# Patient Record
Sex: MALE | Race: BLACK OR AFRICAN AMERICAN | NOT HISPANIC OR LATINO | URBAN - METROPOLITAN AREA
[De-identification: names, ages, dates, MRNs, and addresses within clinical notes are randomized per-mention and may not be internally consistent; named-entity substitution may affect disease eponyms.]

---

## 2019-07-16 ENCOUNTER — INPATIENT (INPATIENT)
Facility: HOSPITAL | Age: 64
LOS: 2 days | Discharge: HOME | DRG: 483 | End: 2019-07-19
Attending: ORTHOPAEDIC SURGERY | Admitting: ORTHOPAEDIC SURGERY
Payer: OTHER MISCELLANEOUS

## 2019-07-16 VITALS
HEIGHT: 68 IN | OXYGEN SATURATION: 94 % | SYSTOLIC BLOOD PRESSURE: 132 MMHG | RESPIRATION RATE: 18 BRPM | TEMPERATURE: 98 F | DIASTOLIC BLOOD PRESSURE: 89 MMHG | WEIGHT: 163.36 LBS | HEART RATE: 95 BPM

## 2019-07-16 DIAGNOSIS — S42.209A UNSPECIFIED FRACTURE OF UPPER END OF UNSPECIFIED HUMERUS, INITIAL ENCOUNTER FOR CLOSED FRACTURE: ICD-10-CM

## 2019-07-16 DIAGNOSIS — E11.9 TYPE 2 DIABETES MELLITUS WITHOUT COMPLICATIONS: ICD-10-CM

## 2019-07-16 DIAGNOSIS — Z98.890 OTHER SPECIFIED POSTPROCEDURAL STATES: Chronic | ICD-10-CM

## 2019-07-16 LAB
ALBUMIN SERPL ELPH-MCNC: 4.1 G/DL — SIGNIFICANT CHANGE UP (ref 3.3–5)
ALP SERPL-CCNC: 136 U/L — HIGH (ref 40–120)
ALT FLD-CCNC: 59 U/L — HIGH (ref 10–45)
ANION GAP SERPL CALC-SCNC: 11 MMOL/L — SIGNIFICANT CHANGE UP (ref 5–17)
APPEARANCE UR: CLEAR — SIGNIFICANT CHANGE UP
APTT BLD: 31.9 SEC — SIGNIFICANT CHANGE UP (ref 27.5–36.3)
AST SERPL-CCNC: 37 U/L — SIGNIFICANT CHANGE UP (ref 10–40)
BASOPHILS # BLD AUTO: 0.03 K/UL — SIGNIFICANT CHANGE UP (ref 0–0.2)
BASOPHILS NFR BLD AUTO: 0.3 % — SIGNIFICANT CHANGE UP (ref 0–2)
BILIRUB SERPL-MCNC: 0.6 MG/DL — SIGNIFICANT CHANGE UP (ref 0.2–1.2)
BILIRUB UR-MCNC: NEGATIVE — SIGNIFICANT CHANGE UP
BUN SERPL-MCNC: 16 MG/DL — SIGNIFICANT CHANGE UP (ref 7–23)
CALCIUM SERPL-MCNC: 10 MG/DL — SIGNIFICANT CHANGE UP (ref 8.4–10.5)
CHLORIDE SERPL-SCNC: 98 MMOL/L — SIGNIFICANT CHANGE UP (ref 96–108)
CO2 SERPL-SCNC: 27 MMOL/L — SIGNIFICANT CHANGE UP (ref 22–31)
COLOR SPEC: YELLOW — SIGNIFICANT CHANGE UP
CREAT SERPL-MCNC: 0.61 MG/DL — SIGNIFICANT CHANGE UP (ref 0.5–1.3)
DIFF PNL FLD: NEGATIVE — SIGNIFICANT CHANGE UP
EOSINOPHIL # BLD AUTO: 0.11 K/UL — SIGNIFICANT CHANGE UP (ref 0–0.5)
EOSINOPHIL NFR BLD AUTO: 1.1 % — SIGNIFICANT CHANGE UP (ref 0–6)
GLUCOSE SERPL-MCNC: 293 MG/DL — HIGH (ref 70–99)
GLUCOSE UR QL: >=1000
HCT VFR BLD CALC: 45.5 % — SIGNIFICANT CHANGE UP (ref 39–50)
HGB BLD-MCNC: 14.5 G/DL — SIGNIFICANT CHANGE UP (ref 13–17)
IMM GRANULOCYTES NFR BLD AUTO: 0.5 % — SIGNIFICANT CHANGE UP (ref 0–1.5)
INR BLD: 1.06 — SIGNIFICANT CHANGE UP (ref 0.88–1.16)
KETONES UR-MCNC: NEGATIVE — SIGNIFICANT CHANGE UP
LEUKOCYTE ESTERASE UR-ACNC: NEGATIVE — SIGNIFICANT CHANGE UP
LYMPHOCYTES # BLD AUTO: 2 K/UL — SIGNIFICANT CHANGE UP (ref 1–3.3)
LYMPHOCYTES # BLD AUTO: 20.3 % — SIGNIFICANT CHANGE UP (ref 13–44)
MCHC RBC-ENTMCNC: 27.2 PG — SIGNIFICANT CHANGE UP (ref 27–34)
MCHC RBC-ENTMCNC: 31.9 GM/DL — LOW (ref 32–36)
MCV RBC AUTO: 85.2 FL — SIGNIFICANT CHANGE UP (ref 80–100)
MONOCYTES # BLD AUTO: 1.07 K/UL — HIGH (ref 0–0.9)
MONOCYTES NFR BLD AUTO: 10.9 % — SIGNIFICANT CHANGE UP (ref 2–14)
NEUTROPHILS # BLD AUTO: 6.57 K/UL — SIGNIFICANT CHANGE UP (ref 1.8–7.4)
NEUTROPHILS NFR BLD AUTO: 66.9 % — SIGNIFICANT CHANGE UP (ref 43–77)
NITRITE UR-MCNC: NEGATIVE — SIGNIFICANT CHANGE UP
NRBC # BLD: 0 /100 WBCS — SIGNIFICANT CHANGE UP (ref 0–0)
PH UR: 5.5 — SIGNIFICANT CHANGE UP (ref 5–8)
PLATELET # BLD AUTO: 454 K/UL — HIGH (ref 150–400)
POTASSIUM SERPL-MCNC: 4.2 MMOL/L — SIGNIFICANT CHANGE UP (ref 3.5–5.3)
POTASSIUM SERPL-SCNC: 4.2 MMOL/L — SIGNIFICANT CHANGE UP (ref 3.5–5.3)
PROT SERPL-MCNC: 8.2 G/DL — SIGNIFICANT CHANGE UP (ref 6–8.3)
PROT UR-MCNC: ABNORMAL MG/DL
PROTHROM AB SERPL-ACNC: 12 SEC — SIGNIFICANT CHANGE UP (ref 10–12.9)
RBC # BLD: 5.34 M/UL — SIGNIFICANT CHANGE UP (ref 4.2–5.8)
RBC # FLD: 13 % — SIGNIFICANT CHANGE UP (ref 10.3–14.5)
SODIUM SERPL-SCNC: 136 MMOL/L — SIGNIFICANT CHANGE UP (ref 135–145)
SP GR SPEC: 1.02 — SIGNIFICANT CHANGE UP (ref 1–1.03)
UROBILINOGEN FLD QL: 1 E.U./DL — SIGNIFICANT CHANGE UP
WBC # BLD: 9.83 K/UL — SIGNIFICANT CHANGE UP (ref 3.8–10.5)
WBC # FLD AUTO: 9.83 K/UL — SIGNIFICANT CHANGE UP (ref 3.8–10.5)

## 2019-07-16 PROCEDURE — 78306 BONE IMAGING WHOLE BODY: CPT | Mod: 26

## 2019-07-16 PROCEDURE — 74176 CT ABD & PELVIS W/O CONTRAST: CPT | Mod: 26

## 2019-07-16 PROCEDURE — 99285 EMERGENCY DEPT VISIT HI MDM: CPT

## 2019-07-16 PROCEDURE — 71046 X-RAY EXAM CHEST 2 VIEWS: CPT | Mod: 26

## 2019-07-16 PROCEDURE — 71250 CT THORAX DX C-: CPT | Mod: 26

## 2019-07-16 PROCEDURE — 93010 ELECTROCARDIOGRAM REPORT: CPT | Mod: NC

## 2019-07-16 RX ORDER — GLUCAGON INJECTION, SOLUTION 0.5 MG/.1ML
1 INJECTION, SOLUTION SUBCUTANEOUS ONCE
Refills: 0 | Status: DISCONTINUED | OUTPATIENT
Start: 2019-07-16 | End: 2019-07-19

## 2019-07-16 RX ORDER — SODIUM CHLORIDE 9 MG/ML
1000 INJECTION, SOLUTION INTRAVENOUS
Refills: 0 | Status: DISCONTINUED | OUTPATIENT
Start: 2019-07-16 | End: 2019-07-19

## 2019-07-16 RX ORDER — DEXTROSE 50 % IN WATER 50 %
12.5 SYRINGE (ML) INTRAVENOUS ONCE
Refills: 0 | Status: DISCONTINUED | OUTPATIENT
Start: 2019-07-16 | End: 2019-07-19

## 2019-07-16 RX ORDER — GLYBURIDE 5 MG
0 TABLET ORAL
Qty: 0 | Refills: 0 | DISCHARGE

## 2019-07-16 RX ORDER — DEXTROSE 50 % IN WATER 50 %
25 SYRINGE (ML) INTRAVENOUS ONCE
Refills: 0 | Status: DISCONTINUED | OUTPATIENT
Start: 2019-07-16 | End: 2019-07-19

## 2019-07-16 RX ORDER — HYDROMORPHONE HYDROCHLORIDE 2 MG/ML
0.5 INJECTION INTRAMUSCULAR; INTRAVENOUS; SUBCUTANEOUS EVERY 4 HOURS
Refills: 0 | Status: DISCONTINUED | OUTPATIENT
Start: 2019-07-16 | End: 2019-07-19

## 2019-07-16 RX ORDER — INSULIN LISPRO 100/ML
VIAL (ML) SUBCUTANEOUS
Refills: 0 | Status: DISCONTINUED | OUTPATIENT
Start: 2019-07-16 | End: 2019-07-19

## 2019-07-16 RX ORDER — ONDANSETRON 8 MG/1
4 TABLET, FILM COATED ORAL EVERY 6 HOURS
Refills: 0 | Status: DISCONTINUED | OUTPATIENT
Start: 2019-07-16 | End: 2019-07-19

## 2019-07-16 RX ORDER — SENNA PLUS 8.6 MG/1
2 TABLET ORAL AT BEDTIME
Refills: 0 | Status: DISCONTINUED | OUTPATIENT
Start: 2019-07-16 | End: 2019-07-19

## 2019-07-16 RX ORDER — DOCUSATE SODIUM 100 MG
100 CAPSULE ORAL THREE TIMES A DAY
Refills: 0 | Status: DISCONTINUED | OUTPATIENT
Start: 2019-07-16 | End: 2019-07-19

## 2019-07-16 RX ORDER — OXYCODONE HYDROCHLORIDE 5 MG/1
10 TABLET ORAL EVERY 4 HOURS
Refills: 0 | Status: DISCONTINUED | OUTPATIENT
Start: 2019-07-16 | End: 2019-07-19

## 2019-07-16 RX ORDER — ACETAMINOPHEN 500 MG
650 TABLET ORAL EVERY 6 HOURS
Refills: 0 | Status: DISCONTINUED | OUTPATIENT
Start: 2019-07-16 | End: 2019-07-19

## 2019-07-16 RX ORDER — OXYCODONE HYDROCHLORIDE 5 MG/1
5 TABLET ORAL EVERY 4 HOURS
Refills: 0 | Status: DISCONTINUED | OUTPATIENT
Start: 2019-07-16 | End: 2019-07-19

## 2019-07-16 RX ORDER — DEXTROSE 50 % IN WATER 50 %
15 SYRINGE (ML) INTRAVENOUS ONCE
Refills: 0 | Status: DISCONTINUED | OUTPATIENT
Start: 2019-07-16 | End: 2019-07-19

## 2019-07-16 RX ADMIN — Medication 4: at 17:26

## 2019-07-16 RX ADMIN — OXYCODONE HYDROCHLORIDE 10 MILLIGRAM(S): 5 TABLET ORAL at 18:49

## 2019-07-16 RX ADMIN — OXYCODONE HYDROCHLORIDE 10 MILLIGRAM(S): 5 TABLET ORAL at 19:49

## 2019-07-16 RX ADMIN — OXYCODONE HYDROCHLORIDE 5 MILLIGRAM(S): 5 TABLET ORAL at 13:55

## 2019-07-16 NOTE — H&P ADULT - PROBLEM SELECTOR PLAN 2
Insulin sliding scale to be ordered while inpatient, monitor finger sticks, diabetic diet, continue appropriate medications

## 2019-07-16 NOTE — PATIENT PROFILE ADULT - NSPROEDALEARNPREF_GEN_A_NUR
individual instruction/verbal instruction/computer/internet/group instruction/skill demonstration/audio

## 2019-07-16 NOTE — ED PROVIDER NOTE - ATTENDING CONTRIBUTION TO CARE
65 yo M with PMH of DM presents to ED for admission for shoulder surgery. Pt pulled his shoulder while moving something at work 1 month ago with persistent pain and had an xray that was suspicious for a pathological fracture. Pt admits to pain. Denies numbness, tingling, swelling, fever, chills, cough, cp, sob. Pt AAO, NAD, right shoulder with limited ROM and TTP over proximal humerus. Labs/ studies noted. Pt is to be admitted for surgery and oncology workup. Preop labs, admit.

## 2019-07-16 NOTE — H&P ADULT - PROBLEM SELECTOR PLAN 1
Discussed with Dr. Orozco, will admit to orthopaedic service   Plan is for OR tomorrow for right proximal humerus resection and reconstruction   NPO/IVF for OR tonight   CT abdomen/pelvis/chest non-contrast  Bone scan  CXR  Labs - CBC, CMP, hepatic function tests, PT/INR/PTT, albumin, urine and blood protein electrophoresis, serum/urine light chains, total serum protein   Per Dr. Orozco, med/onc consulted and will see patient tomorrow  Dr. KAPADIA to contact Dr. Page for medical clearance   Labs/imaging pending review by medical clearance

## 2019-07-16 NOTE — ED ADULT NURSE NOTE - NSIMPLEMENTINTERV_GEN_ALL_ED
Implemented All Universal Safety Interventions:  [place] to call system. Call bell, personal items and telephone within reach. Instruct patient to call for assistance. Room bathroom lighting operational. Non-slip footwear when patient is off stretcher. Physically safe environment: no spills, clutter or unnecessary equipment. Stretcher in lowest position, wheels locked, appropriate side rails in place.

## 2019-07-16 NOTE — ED ADULT TRIAGE NOTE - CHIEF COMPLAINT QUOTE
Patient c/o rt shoulder pain , got injured at work 1 month ago , pain is worsening . Schedule to have surgery jesenia.

## 2019-07-16 NOTE — ED PROVIDER NOTE - CLINICAL SUMMARY MEDICAL DECISION MAKING FREE TEXT BOX
65 yo M with pmh of DM presents to ED for admission for shoulder surgery. Pt pulled his shoulder while moving something at work 1 month ago and then persisted with pain. Pt followed up with Dr. Orozco and had an xray that is suspicious for a pathological fracture. Pt is to be admitted for surgery and an oncology workup. Pt admits to pain. Denies numbness, tingling, swelling, fever, chills, cough, cp, sob. Preop labs, admit.

## 2019-07-16 NOTE — H&P ADULT - HISTORY OF PRESENT ILLNESS
The patient is a 64 year old male sent to the ED from Dr. Orozco's office for a right proximal humerus pathologic fracture. The patient states that he had an injury at work one month ago where he dropped a table and felt a "pull" in his right shoulder. The patient states he had pain in his right shoulder and then recently felt a "pop" in his right shoulder while he was driving which prompted a visit to orthopedist Dr. Mueller; patient had outpatient imaging and was ultimately sent to Dr. Orozco for definitive management. The patient denies pain in any other joints, fever/chills, numbness/tingling to the upper extremities.

## 2019-07-16 NOTE — H&P ADULT - NSHPPHYSICALEXAM_GEN_ALL_CORE
Gen: NAD, sitting comfortably on hospital chair  MSK: Right upper extremity in sling. Sensation intact and equal to bilateral upper extremities. Radial pulse palpable right upper extremity. PIN/AIN/ulnar nerve intact right upper extremity. ROM not tested due to known fracture.

## 2019-07-16 NOTE — ED PROVIDER NOTE - PHYSICAL EXAMINATION
CONSTITUTIONAL: Well-appearing; well-nourished; in no apparent distress.   HEAD: Normocephalic; atraumatic.   EYES: PERRL; EOM intact; conjunctiva and sclera clear  ENT: normal nose; no rhinorrhea; normal pharynx with no erythema or lesions.   NECK: Supple; non-tender; no LAD  CARDIOVASCULAR: Normal S1, S2; no murmurs, rubs, or gallops. Regular rate and rhythm.   RESPIRATORY: Breathing easily; breath sounds clear and equal bilaterally; no wheezes, rhonchi, or rales.  GI: Soft; non-distended; non-tender; no palpable organomegaly.   MSK: R shoulder in sling +tenderness, + swelling, unable to range shoulder   EXT: No cyanosis or edema; N/V intact  SKIN: Normal for age and race; warm; dry; good turgor; no apparent lesions or rash.   NEURO: A & O x 3; face symmetric; grossly unremarkable.   PSYCHOLOGICAL: The patient’s mood and manner are appropriate.

## 2019-07-16 NOTE — PROGRESS NOTE ADULT - SUBJECTIVE AND OBJECTIVE BOX
64 year old male sent to the ED from Dr. Orozco's office for a right proximal humerus pathologic fracture. The patient states that he had an injury at work one month ago where he dropped a table and felt a "pull" in his right shoulder. The patient states he had pain in his right shoulder and then recently felt a "pop" in his right shoulder while he was driving which prompted a visit to orthopedist Dr. Mueller; patient had outpatient imaging and was ultimately sent to Dr. Orozco for definitive management. The patient denies pain in any other joints, fever/chills, numbness/tingling to the upper extremities.       Patient History:   Past Medical, Past Surgical, and Family History:  PAST MEDICAL HISTORY:  Type 2 diabetes mellitus.     PAST SURGICAL HISTORY:  H/O foot surgery.      MEDICATIONS:        acetaminophen   Tablet .. 650 milliGRAM(s) Oral every 6 hours PRN  HYDROmorphone  Injectable 0.5 milliGRAM(s) IV Push every 4 hours PRN  ondansetron Injectable 4 milliGRAM(s) IV Push every 6 hours PRN  oxyCODONE    IR 10 milliGRAM(s) Oral every 4 hours PRN  oxyCODONE    IR 5 milliGRAM(s) Oral every 4 hours PRN    docusate sodium 100 milliGRAM(s) Oral three times a day  senna 2 Tablet(s) Oral at bedtime    dextrose 40% Gel 15 Gram(s) Oral once PRN  dextrose 50% Injectable 12.5 Gram(s) IV Push once  dextrose 50% Injectable 25 Gram(s) IV Push once  dextrose 50% Injectable 25 Gram(s) IV Push once  glucagon  Injectable 1 milliGRAM(s) IntraMuscular once PRN  insulin lispro (HumaLOG) corrective regimen sliding scale   SubCutaneous three times a day before meals    dextrose 5%. 1000 milliLiter(s) IV Continuous <Continuous>  lactated ringers. 1000 milliLiter(s) IV Continuous <Continuous>      Complaint: Pain    Otherwise 12 point review of systems is normal.    PHYSICAL EXAM:    Constitutional: NAD  Eyes: PERRL, EOMI, sclera non-icteric  Neck: supple, no masses, no JVD  Respiratory: CTA b/l, good air entry b/l, no wheezing, rhonchi, rales, with normal respiratory effort and no intercostal retractions  Cardiovascular: RRR, normal S1S2, no M/R/G  Gastrointestinal: soft, NTND, no masses palpable, BS normal in all four quadrants,   Extremities:  no c/c/e  Neurological: AAOx3      ICU Vital Signs Last 24 Hrs  T(C): 36.5 (16 Jul 2019 17:43), Max: 37.2 (16 Jul 2019 13:44)  T(F): 97.7 (16 Jul 2019 17:43), Max: 99 (16 Jul 2019 13:44)  HR: 91 (16 Jul 2019 17:43) (69 - 95)  BP: 133/81 (16 Jul 2019 17:43) (115/78 - 133/81)  BP(mean): --  ABP: --  ABP(mean): --  RR: 17 (16 Jul 2019 17:43) (16 - 18)  SpO2: 98% (16 Jul 2019 17:43) (94% - 98%)    LABS:	 	  CARDIAC MARKERS:    < from: NM Bone Imaging Total (07.16.19 @ 16:33) >  Impression:     Abnormal radiotracer uptake within the proximal humerus at the site of   the fracture. The uptake appears to extend to the scapula correlating to   the lesion seen on CT chest. No additional sites of abnormal radiotracer   uptake.    < end of copied text >    < from: CT Abdomen and Pelvis No Cont (07.16.19 @ 12:50) >    IMPRESSION: Limited study without contrast.    Osteolytic lesion in the right humeral head and neck as wellas right   scapula. Differential diagnosis includes neoplasms such as lymphoma and   plasmacytoma, etc as well as infection.      < end of copied text >  < from: CT Chest No Cont (07.16.19 @ 12:49) >    IMPRESSION: Limited study without contrast.    Osteolytic lesion in the right humeral head and neck as wellas right   scapula. Differential diagnosis includes neoplasms such as lymphoma and   plasmacytoma, etc as well as infection.    A couple of subcentimeter pulmonary nodules, which do not have typical   appearance of metastasis.    Hypodense hepatic lesion, probably a cyst. Recommend correlation with   ultrasound.      < end of copied text >  < from: Xray Chest 2 Views PA/Lat (07.16.19 @ 12:50) >    PA and lateral.    Findings/  impression: Proximal right humeral shaft fracture. Heart, lungs and   mediastinum, unremarkable..  Thoracic spine degenerative changes. Article   of clothing metallic structure overlying the right anterior hemithorax.   Colonic contrast material.          < end of copied text >                                 14.5   9.83  )-----------( 454      ( 16 Jul 2019 12:28 )             45.5     07-16    136  |  98  |  16  ----------------------------<  293<H>  4.2   |  27  |  0.61    Ca    10.0      16 Jul 2019 12:28    TPro  8.2  /  Alb  4.1  /  TBili  0.6  /  DBili  0.2  /  AST  37  /  ALT  59<H>  /  AlkPhos  136<H>  07-16      ASSESSMENT/PLAN: 	  64M with right proximal humerus pathologic fracture     Problem/Plan - 1:  ·  Problem: Proximal humeral fracture.  Plan: Discussed with Dr. Orozco, will admit to orthopaedic service   Plan is for OR tomorrow for right proximal humerus resection and reconstruction   NPO/IVF for OR tonight   CT abdomen/pelvis/chest non-contrast  Bone scan  CXR    Problem/Plan - 2:  ·  Problem: Type 2 diabetes mellitus.  Plan: Insulin sliding scale to be ordered while inpatient, monitor finger sticks, diabetic diet, continue appropriate medications.

## 2019-07-16 NOTE — ED ADULT TRIAGE NOTE - MODE OF ARRIVAL
How Severe Is Your Skin Lesion?: moderate
Have Your Skin Lesions Been Treated?: not been treated
Is This A New Presentation, Or A Follow-Up?: Skin Lesions
Public Transport

## 2019-07-16 NOTE — ED PROVIDER NOTE - OBJECTIVE STATEMENT
63 yo M with pmh of DM presents to ED for admission for shoulder surgery. Pt pulled his shoulder while moving something at work 1 month ago and then persisted with pain. Pt followed up with Dr. Orozco and had an xray that is suspicious for a pathological fracture. Pt is to be admitted for surgery and an oncology workup. Pt admits to pain. Denies numbness, tingling, swelling, fever, chills, cough, cp, sob.

## 2019-07-16 NOTE — ED ADULT NURSE NOTE - OBJECTIVE STATEMENT
states he injured his right arm after he dropped a table, then re injured it two weeks ago. states he was driving, coughed and felt a pop to right shoulder. denies any numbness/tingling to right upper. states he has a rotator cuff injury. right arm noted to be in a sling. states he took 5 mg oxycodone 2 hours ago.

## 2019-07-17 ENCOUNTER — RESULT REVIEW (OUTPATIENT)
Age: 64
End: 2019-07-17

## 2019-07-17 LAB
% ALBUMIN: 50.1 % — SIGNIFICANT CHANGE UP
% ALPHA 1: 5.6 % — SIGNIFICANT CHANGE UP
% ALPHA 2: 14.3 % — SIGNIFICANT CHANGE UP
% BETA: 14.4 % — SIGNIFICANT CHANGE UP
% GAMMA: 15.6 % — SIGNIFICANT CHANGE UP
ALBUMIN SERPL ELPH-MCNC: 4.1 G/DL — SIGNIFICANT CHANGE UP (ref 3.6–5.5)
ALBUMIN/GLOB SERPL ELPH: 1 RATIO — SIGNIFICANT CHANGE UP
ALPHA1 GLOB SERPL ELPH-MCNC: 0.5 G/DL — HIGH (ref 0.1–0.4)
ALPHA2 GLOB SERPL ELPH-MCNC: 1.2 G/DL — HIGH (ref 0.5–1)
ANION GAP SERPL CALC-SCNC: 11 MMOL/L — SIGNIFICANT CHANGE UP (ref 5–17)
APTT BLD: 31 SEC — SIGNIFICANT CHANGE UP (ref 27.5–36.3)
B-GLOBULIN SERPL ELPH-MCNC: 1.2 G/DL — HIGH (ref 0.5–1)
BUN SERPL-MCNC: 15 MG/DL — SIGNIFICANT CHANGE UP (ref 7–23)
CALCIUM SERPL-MCNC: 9.7 MG/DL — SIGNIFICANT CHANGE UP (ref 8.4–10.5)
CHLORIDE SERPL-SCNC: 98 MMOL/L — SIGNIFICANT CHANGE UP (ref 96–108)
CO2 SERPL-SCNC: 28 MMOL/L — SIGNIFICANT CHANGE UP (ref 22–31)
CREAT SERPL-MCNC: 0.65 MG/DL — SIGNIFICANT CHANGE UP (ref 0.5–1.3)
GAMMA GLOBULIN: 1.3 G/DL — SIGNIFICANT CHANGE UP (ref 0.6–1.6)
GLUCOSE SERPL-MCNC: 252 MG/DL — HIGH (ref 70–99)
HBA1C BLD-MCNC: 10.5 % — HIGH (ref 4–5.6)
HCT VFR BLD CALC: 42.8 % — SIGNIFICANT CHANGE UP (ref 39–50)
HGB BLD-MCNC: 13.2 G/DL — SIGNIFICANT CHANGE UP (ref 13–17)
INR BLD: 1.07 — SIGNIFICANT CHANGE UP (ref 0.88–1.16)
KAPPA LC SER QL IFE: 6.42 MG/DL — HIGH (ref 0.33–1.94)
KAPPA/LAMBDA FREE LIGHT CHAIN RATIO, SERUM: 2.15 RATIO — HIGH (ref 0.26–1.65)
LAMBDA LC SER QL IFE: 2.99 MG/DL — HIGH (ref 0.57–2.63)
MCHC RBC-ENTMCNC: 26.7 PG — LOW (ref 27–34)
MCHC RBC-ENTMCNC: 30.8 GM/DL — LOW (ref 32–36)
MCV RBC AUTO: 86.6 FL — SIGNIFICANT CHANGE UP (ref 80–100)
NRBC # BLD: 0 /100 WBCS — SIGNIFICANT CHANGE UP (ref 0–0)
PLATELET # BLD AUTO: 402 K/UL — HIGH (ref 150–400)
POTASSIUM SERPL-MCNC: 3.9 MMOL/L — SIGNIFICANT CHANGE UP (ref 3.5–5.3)
POTASSIUM SERPL-SCNC: 3.9 MMOL/L — SIGNIFICANT CHANGE UP (ref 3.5–5.3)
PROT PATTERN SERPL ELPH-IMP: SIGNIFICANT CHANGE UP
PROT SERPL-MCNC: 8.2 G/DL — SIGNIFICANT CHANGE UP (ref 6–8.3)
PROTHROM AB SERPL-ACNC: 12.1 SEC — SIGNIFICANT CHANGE UP (ref 10–12.9)
RBC # BLD: 4.94 M/UL — SIGNIFICANT CHANGE UP (ref 4.2–5.8)
RBC # FLD: 13.2 % — SIGNIFICANT CHANGE UP (ref 10.3–14.5)
SODIUM SERPL-SCNC: 137 MMOL/L — SIGNIFICANT CHANGE UP (ref 135–145)
WBC # BLD: 9.31 K/UL — SIGNIFICANT CHANGE UP (ref 3.8–10.5)
WBC # FLD AUTO: 9.31 K/UL — SIGNIFICANT CHANGE UP (ref 3.8–10.5)

## 2019-07-17 PROCEDURE — 99222 1ST HOSP IP/OBS MODERATE 55: CPT

## 2019-07-17 PROCEDURE — 73030 X-RAY EXAM OF SHOULDER: CPT | Mod: 26,RT

## 2019-07-17 RX ORDER — HYDROMORPHONE HYDROCHLORIDE 2 MG/ML
0.5 INJECTION INTRAMUSCULAR; INTRAVENOUS; SUBCUTANEOUS
Refills: 0 | Status: DISCONTINUED | OUTPATIENT
Start: 2019-07-17 | End: 2019-07-19

## 2019-07-17 RX ORDER — BUPIVACAINE 13.3 MG/ML
20 INJECTION, SUSPENSION, LIPOSOMAL INFILTRATION ONCE
Refills: 0 | Status: DISCONTINUED | OUTPATIENT
Start: 2019-07-17 | End: 2019-07-19

## 2019-07-17 RX ORDER — CEFAZOLIN SODIUM 1 G
2000 VIAL (EA) INJECTION EVERY 8 HOURS
Refills: 0 | Status: COMPLETED | OUTPATIENT
Start: 2019-07-17 | End: 2019-07-18

## 2019-07-17 RX ORDER — CEFAZOLIN SODIUM 1 G
2000 VIAL (EA) INJECTION EVERY 8 HOURS
Refills: 0 | Status: DISCONTINUED | OUTPATIENT
Start: 2019-07-17 | End: 2019-07-17

## 2019-07-17 RX ADMIN — OXYCODONE HYDROCHLORIDE 10 MILLIGRAM(S): 5 TABLET ORAL at 11:20

## 2019-07-17 RX ADMIN — OXYCODONE HYDROCHLORIDE 10 MILLIGRAM(S): 5 TABLET ORAL at 00:58

## 2019-07-17 RX ADMIN — Medication 2000 MILLIGRAM(S): at 20:22

## 2019-07-17 RX ADMIN — Medication 4: at 07:37

## 2019-07-17 RX ADMIN — OXYCODONE HYDROCHLORIDE 10 MILLIGRAM(S): 5 TABLET ORAL at 19:32

## 2019-07-17 RX ADMIN — OXYCODONE HYDROCHLORIDE 10 MILLIGRAM(S): 5 TABLET ORAL at 10:19

## 2019-07-17 RX ADMIN — SENNA PLUS 2 TABLET(S): 8.6 TABLET ORAL at 23:17

## 2019-07-17 RX ADMIN — Medication 100 MILLIGRAM(S): at 23:16

## 2019-07-17 RX ADMIN — OXYCODONE HYDROCHLORIDE 10 MILLIGRAM(S): 5 TABLET ORAL at 23:16

## 2019-07-17 RX ADMIN — OXYCODONE HYDROCHLORIDE 10 MILLIGRAM(S): 5 TABLET ORAL at 00:28

## 2019-07-17 RX ADMIN — OXYCODONE HYDROCHLORIDE 10 MILLIGRAM(S): 5 TABLET ORAL at 19:01

## 2019-07-17 RX ADMIN — Medication 4: at 16:50

## 2019-07-17 NOTE — PROGRESS NOTE ADULT - SUBJECTIVE AND OBJECTIVE BOX
Orthopedics Post Op Check    Procedure: right proximal humerus orif   Surgeon:    Pt.    Denies any SOB/CP/nausea/vomiting.     Vital Signs Last 24 Hrs  T(C): 36.5 (17 Jul 2019 09:11), Max: 37 (17 Jul 2019 04:48)  T(F): 97.7 (17 Jul 2019 09:11), Max: 98.6 (17 Jul 2019 04:48)  HR: 69 (17 Jul 2019 09:11) (67 - 91)  BP: 154/90 (17 Jul 2019 09:11) (128/76 - 154/90)  BP(mean): --  RR: 17 (17 Jul 2019 09:11) (16 - 18)  SpO2: 99% (17 Jul 2019 09:11) (95% - 99%)      Dressing C/D/I    Pulses: Brachial/Radial  SLT:  Motor:   Finger Int  Wrist flexion/ext  Biceps/triceps/Delts                          13.2   9.31  )-----------( 402      ( 17 Jul 2019 06:32 )             42.8   07-17    137  |  98  |  15  ----------------------------<  252<H>  3.9   |  28  |  0.65    Ca    9.7      17 Jul 2019 06:32    TPro  8.2  /  Alb  4.1  /  TBili  0.6  /  DBili  0.2  /  AST  37  /  ALT  59<H>  /  AlkPhos  136<H>  07-16    Post op XR:    A/P: 64yoMale POD#0 s/p   - Stable  - Pain Control  - DVT ppx:  - Post op abx:  - PT, WBS:   - F/U AM Labs Orthopedics Post Op Check    Procedure: right shoulder hemiarthroplasty  Surgeon: Pam    Pt. stable, laying in bed comfortably.  Denies any SOB/CP/nausea/vomiting, numbness or tingling in right ue.     Vital Signs Last 24 Hrs  T(C): 36.5 (17 Jul 2019 09:11), Max: 37 (17 Jul 2019 04:48)  T(F): 97.7 (17 Jul 2019 09:11), Max: 98.6 (17 Jul 2019 04:48)  HR: 69 (17 Jul 2019 09:11) (67 - 91)  BP: 154/90 (17 Jul 2019 09:11) (128/76 - 154/90)  BP(mean): --  RR: 17 (17 Jul 2019 09:11) (16 - 18)  SpO2: 99% (17 Jul 2019 09:11) (95% - 99%)      Dressing C/D/I with 1 drain     Pulses: Brachial/Radial pulses 2+ b/l ues   SLT:  intact M/U/R  Motor:  5/5,Finger Int 5/5,Wrist flexion/ext 5/5, Biceps/triceps/Delts NOT TESTES 2/2 TO SX, AIN/PIN intact b/l ues                          13.2   9.31  )-----------( 402      ( 17 Jul 2019 06:32 )             42.8   07-17    137  |  98  |  15  ----------------------------<  252<H>  3.9   |  28  |  0.65    Ca    9.7      17 Jul 2019 06:32    TPro  8.2  /  Alb  4.1  /  TBili  0.6  /  DBili  0.2  /  AST  37  /  ALT  59<H>  /  AlkPhos  136<H>  07-16    Post op XR:    A/P: 64yoMale POD#0 s/p right shoulder hemiathroplasty   - Stable  - Pain Control  - DVT ppx:scds  - Post op abx: ancef   - PT, WBS: nwb right ue with sling   - F/U AM Labs

## 2019-07-17 NOTE — PROGRESS NOTE ADULT - SUBJECTIVE AND OBJECTIVE BOX
STATUS POST: Right proximal humerus pathologic fracture                       SUBJECTIVE: Patient seen and examined. States to pain to right shoulder but controlled. Patient cb any CP, SOB,f ever, chills, States to tingling of 1st digit of right hand.     Pain:  well controlled      OBJECTIVE:     Vital Signs Last 24 Hrs  T(C): 36.5 (2019 09:11), Max: 37.2 (2019 13:44)  T(F): 97.7 (2019 09:11), Max: 99 (2019 13:44)  HR: 69 (2019 09:11) (67 - 91)  BP: 154/90 (2019 09:11) (115/78 - 154/90)  BP(mean): --  RR: 17 (2019 09:11) (16 - 18)  SpO2: 99% (2019 09:11) (95% - 99%)    Affected extremity:          No open lesions, no sign of infection - lidocaine path to right lateral deltoid         Sensation: intact to light touch          Motor exam:  4+/5 to right  strength slight decrease 2/2 to pain         warm, well-perfused; radial pulse 2+             I&O's Detail    2019 07:01  -  2019 07:00  --------------------------------------------------------  IN:    Oral Fluid: 360 mL  Total IN: 360 mL    OUT:  Total OUT: 0 mL    Total NET: 360 mL          LABS:                        13.2   9.31  )-----------( 402      ( 2019 06:32 )             42.8         137  |  98  |  15  ----------------------------<  252<H>  3.9   |  28  |  0.65    Ca    9.7      2019 06:32    TPro  8.2  /  Alb  4.1  /  TBili  0.6  /  DBili  0.2  /  AST  37  /  ALT  59<H>  /  AlkPhos  136<H>  07-16    PT/INR - ( 2019 06:32 )   PT: 12.1 sec;   INR: 1.07          PTT - ( 2019 06:32 )  PTT:31.0 sec  Urinalysis Basic - ( 2019 14:08 )    Color: Yellow / Appearance: Clear / S.025 / pH: x  Gluc: x / Ketone: NEGATIVE  / Bili: Negative / Urobili: 1.0 E.U./dL   Blood: x / Protein: Trace mg/dL / Nitrite: NEGATIVE   Leuk Esterase: NEGATIVE / RBC: < 5 /HPF / WBC < 5 /HPF   Sq Epi: x / Non Sq Epi: 0-5 /HPF / Bacteria: Present /HPF        MEDICATIONS:    acetaminophen   Tablet .. 650 milliGRAM(s) Oral every 6 hours PRN  HYDROmorphone  Injectable 0.5 milliGRAM(s) IV Push every 4 hours PRN  ondansetron Injectable 4 milliGRAM(s) IV Push every 6 hours PRN  oxyCODONE    IR 10 milliGRAM(s) Oral every 4 hours PRN  oxyCODONE    IR 5 milliGRAM(s) Oral every 4 hours PRN      ASSESSMENT AND PLAN:     1. Analgesic pain control  2. DVT prophylaxis:      SCDs      Other:   3 For OR today  4. Weight Bearing Status:  NWB to RUE   5. Disposition: Pending OR today

## 2019-07-17 NOTE — CONSULT NOTE ADULT - SUBJECTIVE AND OBJECTIVE BOX
PICC line flushes easily, no blood return from either port.  PICC flushed with saline and heparin and dressing changed, tolerated well.  Discharged, nad  
65 yo M with hx of diabetes and foot surgery admitted with pathologic fracture of right shoulder. On imaging noted to have lytic lesions of right shoulder with pulmonary nodules and cystic lesion in liver. High suspicion of malignancy. Will be going for shoulder surgery this morning. Today still complains of shoulder pain. No fevers, night sweats, weight loss, dizziness, cp, abd pain, diarrhea, dysuria, leg swelling.    Past medical/ surg hx:  Diabetes, foot surgery.    Social hx:  Current smoker- approx 8 cigs per day. No drugs.    Family history:  Not pertinent currently    Home Medications:  glyBURIDE:  (16 Jul 2019 14:23)  Janumet:  (16 Jul 2019 14:23)    Allergies  No Known Allergies    Physical exam    Vital Signs Last 24 Hrs  T(C): 36.5 (17 Jul 2019 09:11), Max: 37.2 (16 Jul 2019 13:44)  T(F): 97.7 (17 Jul 2019 09:11), Max: 99 (16 Jul 2019 13:44)  HR: 69 (17 Jul 2019 09:11) (67 - 95)  BP: 154/90 (17 Jul 2019 09:11) (115/78 - 154/90)  BP(mean): --  RR: 17 (17 Jul 2019 09:11) (16 - 18)  SpO2: 99% (17 Jul 2019 09:11) (94% - 99%)  Pleasant male.  Laying in bed.  NAD, AAOx3, no scleral icterus  Bibasilar crackles mild  Reg rate  +BS, soft, no organomegaly  +pulses, equal bl  Right arm in sling.    Labs:  Reviewed- coags ok  Plt count 402- reactive.  High glucose    Imaging:  Reviewed.
Patient is a 64y old  Male who presents with a chief complaint of +right shoulder pain (16 Jul 2019 18:54)        HPI:  The patient is a 64 year old male sent to the ED from Dr. Orozco's office for a right proximal humerus pathologic fracture. The patient states that he had an injury at work one month ago where he dropped a table and felt a "pull" in his right shoulder. The patient states he had pain in his right shoulder and then recently felt a "pop" in his right shoulder while he was driving which prompted a visit to orthopedist Dr. Mueller; patient had outpatient imaging and was ultimately sent to Dr. Orozco for definitive management. The patient denies pain in any other joints, fever/chills, numbness/tingling to the upper extremities. (16 Jul 2019 12:32)    Tumor in the right shoulder - numbness in the RUE  Allergies  No Known Allergies      Health Issues  PROXIMAL HUMERAL FRACTUR  Handoff  MEWS Score  Type 2 diabetes mellitus  Proximal humeral fracture  Type 2 diabetes mellitus  Proximal humeral fracture  H/O foot surgery  No significant past surgical history  SHOULDER INJURY        FAMILY HISTORY:      MEDICATIONS  (STANDING):  dextrose 5%. 1000 milliLiter(s) (50 mL/Hr) IV Continuous <Continuous>  dextrose 50% Injectable 12.5 Gram(s) IV Push once  dextrose 50% Injectable 25 Gram(s) IV Push once  dextrose 50% Injectable 25 Gram(s) IV Push once  docusate sodium 100 milliGRAM(s) Oral three times a day  insulin lispro (HumaLOG) corrective regimen sliding scale   SubCutaneous three times a day before meals  lactated ringers. 1000 milliLiter(s) (100 mL/Hr) IV Continuous <Continuous>  senna 2 Tablet(s) Oral at bedtime    MEDICATIONS  (PRN):  acetaminophen   Tablet .. 650 milliGRAM(s) Oral every 6 hours PRN Temp greater or equal to 38C (100.4F), Mild Pain (1 - 3)  dextrose 40% Gel 15 Gram(s) Oral once PRN Blood Glucose LESS THAN 70 milliGRAM(s)/deciliter  glucagon  Injectable 1 milliGRAM(s) IntraMuscular once PRN Glucose LESS THAN 70 milligrams/deciliter  HYDROmorphone  Injectable 0.5 milliGRAM(s) IV Push every 4 hours PRN breakthrough pain  ondansetron Injectable 4 milliGRAM(s) IV Push every 6 hours PRN Nausea and/or Vomiting  oxyCODONE    IR 10 milliGRAM(s) Oral every 4 hours PRN Severe Pain (7 - 10)  oxyCODONE    IR 5 milliGRAM(s) Oral every 4 hours PRN Moderate Pain (4 - 6)      PAST MEDICAL & SURGICAL HISTORY:  Type 2 diabetes mellitus  H/O foot surgery      Labs                          13.2   9.31  )-----------( 402      ( 17 Jul 2019 06:32 )             42.8     07-17    137  |  98  |  15  ----------------------------<  252<H>  3.9   |  28  |  0.65    Ca    9.7      17 Jul 2019 06:32    TPro  8.2  /  Alb  4.1  /  TBili  0.6  /  DBili  0.2  /  AST  37  /  ALT  59<H>  /  AlkPhos  136<H>  07-16      Radiology:    Physical Exam    MENTAL STATUS  -Level of Consciousness- awake    Orientation- person, place time  Language- aphasia/ dysarthria- nl  Memory- recent and remote- nl      Cranial Nerve 1- 12  Pupils- equal and reactive  Eye movements- full  Facial - no asymmetry   Lower CN-nl    Gait and Station- n/a    MOTOR  Upper- weakness proximal RUE - pain  Lower- no foot drop    Reflexes- decreased    Sensation- no sensory level    Cerebellar- no tremors    vascular - no bruits    Assessment- RUE tumor with Nerve involvement     Plan  as per ortho
REASON FOR CONSULT:    HISTORY OF PRESENT ILLNESS:  64 year old male sent to the ED from Dr. Orozco's office for a right proximal humerus pathologic fracture. The patient states that he had an injury at work one month ago where he dropped a table and felt a "pull" in his right shoulder. The patient states he had pain in his right shoulder and then recently felt a "pop" in his right shoulder while he was driving which prompted a visit to orthopedist Dr. Mueller; patient had outpatient imaging and was ultimately sent to Dr. Orozco for definitive management. The patient denies pain in any other joints, fever/chills, numbness/tingling to the upper extremities.     PAST MEDICAL & SURGICAL HISTORY:  Type 2 diabetes mellitus  H/O foot surgery      [ ] Diabetes   [ ] Hypertension  [ ] Hyperlipidemia  [ ] CAD  [ ] PCI  [ ] CABG    PREVIOUS DIAGNOSTIC TESTING:    [ ] Echocardiogram:  [ ]  Catheterization:  [ ] Stress Test:  	    MEDICATIONS:        acetaminophen   Tablet .. 650 milliGRAM(s) Oral every 6 hours PRN  HYDROmorphone  Injectable 0.5 milliGRAM(s) IV Push every 4 hours PRN  ondansetron Injectable 4 milliGRAM(s) IV Push every 6 hours PRN  oxyCODONE    IR 10 milliGRAM(s) Oral every 4 hours PRN  oxyCODONE    IR 5 milliGRAM(s) Oral every 4 hours PRN    docusate sodium 100 milliGRAM(s) Oral three times a day  senna 2 Tablet(s) Oral at bedtime    dextrose 40% Gel 15 Gram(s) Oral once PRN  dextrose 50% Injectable 12.5 Gram(s) IV Push once  dextrose 50% Injectable 25 Gram(s) IV Push once  dextrose 50% Injectable 25 Gram(s) IV Push once  glucagon  Injectable 1 milliGRAM(s) IntraMuscular once PRN  insulin lispro (HumaLOG) corrective regimen sliding scale   SubCutaneous three times a day before meals    dextrose 5%. 1000 milliLiter(s) IV Continuous <Continuous>  lactated ringers. 1000 milliLiter(s) IV Continuous <Continuous>      FAMILY HISTORY:      SOCIAL HISTORY:    [ x] Non-smoker  [ ] Smoker  [ ] Alcohol    FAMILY HX: NC    Allergies    No Known Allergies    Intolerances    	    REVIEW OF SYSTEMS:    [x] as per HPI  CONSTITUTIONAL: No fever, weight loss, or fatigue  ENT:  No difficulty hearing, tinnitus, vertigo; No sinus or throat pain  RESPIRATORY: No cough, wheezing, chills or hemoptysis; No Shortness of Breath  CARDIOVASCULAR: No chest pain, palpitations, dizziness, or leg swelling  GASTROINTESTINAL: No abdominal or epigastric pain. No nausea, vomiting, or hematemesis; No diarrhea or constipation. No melena or hematochezia.  GENITOURINARY: No dysuria, frequency, hematuria, or incontinence  NEUROLOGICAL: No headaches, memory loss, loss of strength, numbness, or tremors  MUSCULOSKELETAL: No joint pain or swelling; No muscle, back, or extremity pain  [x] All others negative	  [ ] Unable to obtain    PHYSICAL EXAM:  T(C): 36.5 (07-17-19 @ 09:11), Max: 37.2 (07-16-19 @ 13:44)  HR: 69 (07-17-19 @ 09:11) (67 - 95)  BP: 154/90 (07-17-19 @ 09:11) (115/78 - 154/90)  RR: 17 (07-17-19 @ 09:11) (16 - 18)  SpO2: 99% (07-17-19 @ 09:11) (94% - 99%)  Wt(kg): --  I&O's Summary    16 Jul 2019 07:01  -  17 Jul 2019 07:00  --------------------------------------------------------  IN: 360 mL / OUT: 0 mL / NET: 360 mL        Appearance: Normal	  HEENT:   Normal oral mucosa, PERRL, EOMI	  Lymphatic: No lymphadenopathy  Cardiovascular: Normal S1 S2, No JVD, No murmurs, No edema  Respiratory: Lungs clear to auscultation	  Psychiatry: A & O x 3, Mood & affect appropriate  Gastrointestinal:  Soft, Non-tender, + BS	  Skin: No rashes, No ecchymoses, No cyanosis	  Neurologic: Non-focal  Extremities: Normal range of motion, No clubbing, cyanosis or edema  Vascular: Peripheral pulses palpable 2+ bilaterally    TELEMETRY: 	    ECG:  (in Alpha) NSR no st changes voltage criteria for LVH, normal variant  ECHO:   STRESS:  CATH:  	  RADIOLOGY:  CXR: < from: Xray Chest 2 Views PA/Lat (07.16.19 @ 12:50) >  impression: Proximal right humeral shaft fracture. Heart, lungs and   mediastinum, unremarkable..  Thoracic spine degenerative changes. Article   of clothing metallic structure overlying the right anterior hemithorax.   Colonic contrast material.    < end of copied text >    CT:  US:   	  	  LABS:	 	    CARDIAC MARKERS:                                  13.2   9.31  )-----------( 402      ( 17 Jul 2019 06:32 )             42.8     07-17    137  |  98  |  15  ----------------------------<  252<H>  3.9   |  28  |  0.65    Ca    9.7      17 Jul 2019 06:32    TPro  8.2  /  Alb  4.1  /  TBili  0.6  /  DBili  0.2  /  AST  37  /  ALT  59<H>  /  AlkPhos  136<H>  07-16    proBNP:   Lipid Profile:   HgA1c: Hemoglobin A1C, Whole Blood: 10.5 % (07-17 @ 06:32)    TSH:     ASSESSMENT/PLAN: 	    # Pre op CV eval -cardiac optimized  RCRI 1 (DM) Class 1 Risk  Gonzalez CV Score - very low risk  can obtain echo but would not hold op  not on BB - no need to initiate pre op  >4 METS, ASA Class 3    #CAD - only CRF is DM  EKG NSR  no murmur on exam  >7 METS  can obtain echo but would not hold op    #HTN - at goal    #GDM - mgmt per PCP
(2) very limited

## 2019-07-17 NOTE — PRE-OP CHECKLIST - SELECT TESTS ORDERED
EKG/BMP/CMP/PT/PTT/INR/Urinalysis/Type and Screen/CXR/CBC POCT Blood Glucose/Urinalysis/246/PT/PTT/Type and Screen/CBC/CMP/CXR/INR/EKG/BMP

## 2019-07-17 NOTE — CONSULT NOTE ADULT - ASSESSMENT
63 yo M with pathologic fracture of right shoulder. Admitted for surgery.    Pathologic fracture- lytic shoulder lesions.  High suspicion for malignancy.  Still unknown primary.  Will have pathology from shoulder.  Some pulm nodules which may be concerning.   Cystic liver lesion.  Renal lesion noted as well.  Recommend PET/CT.   Will add MM panel to tomorrow's labs.  Will discuss case with Dr. KAPADIA.     Thank you  Murtaza Regalado MD

## 2019-07-17 NOTE — PROGRESS NOTE ADULT - SUBJECTIVE AND OBJECTIVE BOX
64 year old male sent to the ED from Dr. Orozco's office for a right proximal humerus pathologic fracture. The patient states that he had an injury at work one month ago where he dropped a table and felt a "pull" in his right shoulder. The patient states he had pain in his right shoulder and then recently felt a "pop" in his right shoulder while he was driving which prompted a visit to orthopedist Dr. Mueller; patient had outpatient imaging and was ultimately sent to Dr. Orozco for definitive management. The patient denies pain in any other joints, fever/chills, numbness/tingling to the upper extremities.       Patient History:   Past Medical, Past Surgical, and Family History:  PAST MEDICAL HISTORY:  Type 2 diabetes mellitus.     PAST SURGICAL HISTORY:  H/O foot surgery.          	  MEDICATIONS:        acetaminophen   Tablet .. 650 milliGRAM(s) Oral every 6 hours PRN  HYDROmorphone  Injectable 0.5 milliGRAM(s) IV Push every 4 hours PRN  ondansetron Injectable 4 milliGRAM(s) IV Push every 6 hours PRN  oxyCODONE    IR 10 milliGRAM(s) Oral every 4 hours PRN  oxyCODONE    IR 5 milliGRAM(s) Oral every 4 hours PRN    docusate sodium 100 milliGRAM(s) Oral three times a day  senna 2 Tablet(s) Oral at bedtime    dextrose 40% Gel 15 Gram(s) Oral once PRN  dextrose 50% Injectable 12.5 Gram(s) IV Push once  dextrose 50% Injectable 25 Gram(s) IV Push once  dextrose 50% Injectable 25 Gram(s) IV Push once  glucagon  Injectable 1 milliGRAM(s) IntraMuscular once PRN  insulin lispro (HumaLOG) corrective regimen sliding scale   SubCutaneous three times a day before meals    dextrose 5%. 1000 milliLiter(s) IV Continuous <Continuous>  lactated ringers. 1000 milliLiter(s) IV Continuous <Continuous>    ICU Vital Signs Last 24 Hrs  T(C): 37 (17 Jul 2019 05:57), Max: 37.2 (16 Jul 2019 13:44)  T(F): 98.6 (17 Jul 2019 05:57), Max: 99 (16 Jul 2019 13:44)  HR: 69 (17 Jul 2019 05:57) (67 - 95)  BP: 137/78 (17 Jul 2019 05:57) (115/78 - 149/77)  BP(mean): --  ABP: --  ABP(mean): --  RR: 17 (17 Jul 2019 05:57) (16 - 18)  SpO2: 95% (17 Jul 2019 05:57) (94% - 98%)      Constitutional: NAD  Eyes: PERRL, EOMI, sclera non-icteric  Neck: supple, no masses, no JVD  Respiratory: CTA b/l, good air entry b/l, no wheezing, rhonchi, rales, with normal respiratory effort and no intercostal retractions  Cardiovascular: RRR, normal S1S2, no M/R/G  Gastrointestinal: soft, NTND, no masses palpable, BS normal in all four quadrants,   Extremities:  no c/c/e  Neurological: AAOx3    Complaint:     Otherwise 12 point review of systems is normal.    PHYSICAL EXAM:                       13.2   9.31  )-----------( 402      ( 17 Jul 2019 06:32 )             42.8     07-17    137  |  98  |  15  ----------------------------<  252<H>  3.9   |  28  |  0.65    Ca    9.7      17 Jul 2019 06:32    TPro  8.2  /  Alb  4.1  /  TBili  0.6  /  DBili  0.2  /  AST  37  /  ALT  59<H>  /  AlkPhos  136<H>  07-16    proBNP:   Lipid Profile:   HgA1c: Hemoglobin A1C, Whole Blood: 10.5 % (07-17 @ 06:32)        ASSESSMENT/PLAN: 	    No medical contraindication for this surgery 64 year old male sent to the ED from Dr. Orozco's office for a right proximal humerus pathologic fracture. The patient states that he had an injury at work one month ago where he dropped a table and felt a "pull" in his right shoulder. The patient states he had pain in his right shoulder and then recently felt a "pop" in his right shoulder while he was driving which prompted a visit to orthopedist Dr. Mueller; patient had outpatient imaging and was ultimately sent to Dr. Orozco for definitive management. The patient denies pain in any other joints, fever/chills, numbness/tingling to the upper extremities.       Patient History:   Past Medical, Past Surgical, and Family History:  PAST MEDICAL HISTORY:  Type 2 diabetes mellitus.     PAST SURGICAL HISTORY:  H/O foot surgery.          	  MEDICATIONS:        acetaminophen   Tablet .. 650 milliGRAM(s) Oral every 6 hours PRN  HYDROmorphone  Injectable 0.5 milliGRAM(s) IV Push every 4 hours PRN  ondansetron Injectable 4 milliGRAM(s) IV Push every 6 hours PRN  oxyCODONE    IR 10 milliGRAM(s) Oral every 4 hours PRN  oxyCODONE    IR 5 milliGRAM(s) Oral every 4 hours PRN    docusate sodium 100 milliGRAM(s) Oral three times a day  senna 2 Tablet(s) Oral at bedtime    dextrose 40% Gel 15 Gram(s) Oral once PRN  dextrose 50% Injectable 12.5 Gram(s) IV Push once  dextrose 50% Injectable 25 Gram(s) IV Push once  dextrose 50% Injectable 25 Gram(s) IV Push once  glucagon  Injectable 1 milliGRAM(s) IntraMuscular once PRN  insulin lispro (HumaLOG) corrective regimen sliding scale   SubCutaneous three times a day before meals    dextrose 5%. 1000 milliLiter(s) IV Continuous <Continuous>  lactated ringers. 1000 milliLiter(s) IV Continuous <Continuous>    ICU Vital Signs Last 24 Hrs  T(C): 37 (17 Jul 2019 05:57), Max: 37.2 (16 Jul 2019 13:44)  T(F): 98.6 (17 Jul 2019 05:57), Max: 99 (16 Jul 2019 13:44)  HR: 69 (17 Jul 2019 05:57) (67 - 95)  BP: 137/78 (17 Jul 2019 05:57) (115/78 - 149/77)  BP(mean): --  ABP: --  ABP(mean): --  RR: 17 (17 Jul 2019 05:57) (16 - 18)  SpO2: 95% (17 Jul 2019 05:57) (94% - 98%)      Constitutional: NAD  Eyes: PERRL, EOMI, sclera non-icteric  Neck: supple, no masses, no JVD  Respiratory: CTA b/l, good air entry b/l, no wheezing, rhonchi, rales, with normal respiratory effort and no intercostal retractions  Cardiovascular: RRR, normal S1S2, no M/R/G  Gastrointestinal: soft, NTND, no masses palpable, BS normal in all four quadrants,   Extremities:  no c/c/e  Neurological: AAOx3    Complaint:     Otherwise 12 point review of systems is normal.    PHYSICAL EXAM:                       13.2   9.31  )-----------( 402      ( 17 Jul 2019 06:32 )             42.8     07-17    137  |  98  |  15  ----------------------------<  252<H>  3.9   |  28  |  0.65    Ca    9.7      17 Jul 2019 06:32    TPro  8.2  /  Alb  4.1  /  TBili  0.6  /  DBili  0.2  /  AST  37  /  ALT  59<H>  /  AlkPhos  136<H>  07-16    proBNP:   Lipid Profile:   HgA1c: Hemoglobin A1C, Whole Blood: 10.5 % (07-17 @ 06:32)        ASSESSMENT/PLAN: 	    No medical contraindication for this surgery today

## 2019-07-17 NOTE — PROGRESS NOTE ADULT - SUBJECTIVE AND OBJECTIVE BOX
Ortho Post Op Check    Procedure: R Should Vasquez-Arthroplasty  Surgeon: Dr. Orozco    Pt comfortable without complaints, pain controlled  Denies CP, SOB, N/V, numbness/tingling     Vital Signs Last 24 Hrs  T(C): 36.5 (07-17-19 @ 18:30), Max: 37.4 (07-17-19 @ 16:15)  T(F): 97.7 (07-17-19 @ 18:30), Max: 99.3 (07-17-19 @ 16:15)  HR: 106 (07-17-19 @ 18:30) (94 - 116)  BP: 162/100 (07-17-19 @ 18:30) (133/88 - 164/83)  BP(mean): 113 (07-17-19 @ 17:58) (105 - 117)  RR: 18 (07-17-19 @ 18:30) (12 - 19)  SpO2: 99% (07-17-19 @ 18:30) (93% - 99%)  AVSS    General: Pt Alert and oriented, NAD  DSG C/D/I. Drain in place with serosanguinous fluid  Pulses: 2+ radial  Sensation: SILT in axillary m/u/r nerve distribution  Motor: + wrist flexion and extension. intact m/u/r motor in hand. 5+ .   Rodriguez in place.                          13.2   9.31  )-----------( 402      ( 17 Jul 2019 06:32 )             42.8   17 Jul 2019 06:32    137    |  98     |  15     ----------------------------<  252    3.9     |  28     |  0.65       TPro  x      /  Alb  4.1    /  TBili  x      /  DBili  x      /  AST  x      /  ALT  x      /  AlkPhos  x      17 Jul 2019 01:10      Post-op X-Ray:    A/P: 64yMale POD#0 s/p   - Stable  - Pain Control  - DVT ppx: Aspirin  - Post op abx: ancef  - PT, WBS: NWB RUE    Ortho Pager [phone]

## 2019-07-18 ENCOUNTER — TRANSCRIPTION ENCOUNTER (OUTPATIENT)
Age: 64
End: 2019-07-18

## 2019-07-18 LAB
ANION GAP SERPL CALC-SCNC: 11 MMOL/L — SIGNIFICANT CHANGE UP (ref 5–17)
BUN SERPL-MCNC: 8 MG/DL — SIGNIFICANT CHANGE UP (ref 7–23)
CALCIUM SERPL-MCNC: 8.8 MG/DL — SIGNIFICANT CHANGE UP (ref 8.4–10.5)
CHLORIDE SERPL-SCNC: 96 MMOL/L — SIGNIFICANT CHANGE UP (ref 96–108)
CO2 SERPL-SCNC: 26 MMOL/L — SIGNIFICANT CHANGE UP (ref 22–31)
CREAT SERPL-MCNC: 0.53 MG/DL — SIGNIFICANT CHANGE UP (ref 0.5–1.3)
GLUCOSE SERPL-MCNC: 229 MG/DL — HIGH (ref 70–99)
HCT VFR BLD CALC: 39 % — SIGNIFICANT CHANGE UP (ref 39–50)
HGB BLD-MCNC: 12.1 G/DL — LOW (ref 13–17)
MCHC RBC-ENTMCNC: 26.8 PG — LOW (ref 27–34)
MCHC RBC-ENTMCNC: 31 GM/DL — LOW (ref 32–36)
MCV RBC AUTO: 86.3 FL — SIGNIFICANT CHANGE UP (ref 80–100)
NRBC # BLD: 0 /100 WBCS — SIGNIFICANT CHANGE UP (ref 0–0)
PLATELET # BLD AUTO: 355 K/UL — SIGNIFICANT CHANGE UP (ref 150–400)
POTASSIUM SERPL-MCNC: 3.9 MMOL/L — SIGNIFICANT CHANGE UP (ref 3.5–5.3)
POTASSIUM SERPL-SCNC: 3.9 MMOL/L — SIGNIFICANT CHANGE UP (ref 3.5–5.3)
RBC # BLD: 4.52 M/UL — SIGNIFICANT CHANGE UP (ref 4.2–5.8)
RBC # FLD: 12.9 % — SIGNIFICANT CHANGE UP (ref 10.3–14.5)
SODIUM SERPL-SCNC: 133 MMOL/L — LOW (ref 135–145)
WBC # BLD: 12.59 K/UL — HIGH (ref 3.8–10.5)
WBC # FLD AUTO: 12.59 K/UL — HIGH (ref 3.8–10.5)

## 2019-07-18 PROCEDURE — 99232 SBSQ HOSP IP/OBS MODERATE 35: CPT

## 2019-07-18 PROCEDURE — 77075 RADEX OSSEOUS SURVEY COMPL: CPT | Mod: 26

## 2019-07-18 RX ORDER — ASPIRIN/CALCIUM CARB/MAGNESIUM 324 MG
325 TABLET ORAL DAILY
Refills: 0 | Status: DISCONTINUED | OUTPATIENT
Start: 2019-07-18 | End: 2019-07-19

## 2019-07-18 RX ORDER — SENNA PLUS 8.6 MG/1
2 TABLET ORAL
Qty: 0 | Refills: 0 | DISCHARGE
Start: 2019-07-18

## 2019-07-18 RX ORDER — OXYCODONE HYDROCHLORIDE 5 MG/1
1 TABLET ORAL
Qty: 28 | Refills: 0
Start: 2019-07-18 | End: 2019-07-24

## 2019-07-18 RX ORDER — DOCUSATE SODIUM 100 MG
1 CAPSULE ORAL
Qty: 0 | Refills: 0 | DISCHARGE
Start: 2019-07-18

## 2019-07-18 RX ORDER — ASPIRIN/CALCIUM CARB/MAGNESIUM 324 MG
1 TABLET ORAL
Qty: 30 | Refills: 0
Start: 2019-07-18 | End: 2019-08-16

## 2019-07-18 RX ORDER — ACETAMINOPHEN 500 MG
2 TABLET ORAL
Qty: 0 | Refills: 0 | DISCHARGE
Start: 2019-07-18

## 2019-07-18 RX ADMIN — OXYCODONE HYDROCHLORIDE 10 MILLIGRAM(S): 5 TABLET ORAL at 07:05

## 2019-07-18 RX ADMIN — OXYCODONE HYDROCHLORIDE 10 MILLIGRAM(S): 5 TABLET ORAL at 19:23

## 2019-07-18 RX ADMIN — Medication 2: at 23:59

## 2019-07-18 RX ADMIN — OXYCODONE HYDROCHLORIDE 10 MILLIGRAM(S): 5 TABLET ORAL at 06:05

## 2019-07-18 RX ADMIN — Medication 6: at 18:47

## 2019-07-18 RX ADMIN — OXYCODONE HYDROCHLORIDE 10 MILLIGRAM(S): 5 TABLET ORAL at 20:15

## 2019-07-18 RX ADMIN — Medication 4: at 11:00

## 2019-07-18 RX ADMIN — OXYCODONE HYDROCHLORIDE 10 MILLIGRAM(S): 5 TABLET ORAL at 11:46

## 2019-07-18 RX ADMIN — Medication 325 MILLIGRAM(S): at 11:45

## 2019-07-18 RX ADMIN — Medication 2000 MILLIGRAM(S): at 03:14

## 2019-07-18 RX ADMIN — Medication 100 MILLIGRAM(S): at 14:29

## 2019-07-18 RX ADMIN — OXYCODONE HYDROCHLORIDE 10 MILLIGRAM(S): 5 TABLET ORAL at 00:17

## 2019-07-18 RX ADMIN — OXYCODONE HYDROCHLORIDE 10 MILLIGRAM(S): 5 TABLET ORAL at 10:46

## 2019-07-18 RX ADMIN — Medication 100 MILLIGRAM(S): at 06:06

## 2019-07-18 NOTE — PROGRESS NOTE ADULT - SUBJECTIVE AND OBJECTIVE BOX
Interval history:  Pt doing ok. Some pain in right shoulder as expected.    65 yo M with hx of diabetes and foot surgery admitted with pathologic fracture of right shoulder. On imaging noted to have lytic lesions of right shoulder with pulmonary nodules and cystic lesion in liver. High suspicion of malignancy. Will be going for shoulder surgery this morning. Today still complains of shoulder pain. No fevers, night sweats, weight loss, dizziness, cp, abd pain, diarrhea, dysuria, leg swelling.    Past medical/ surg hx:  Diabetes, foot surgery.    Social hx:  Current smoker- approx 8 cigs per day. No drugs.    Family history:  Not pertinent currently    Home Medications:  glyBURIDE:  (16 Jul 2019 14:23)  Janumet:  (16 Jul 2019 14:23)    Allergies  No Known Allergies    Physical exam    Vital Signs Last 24 Hrs  T(C): 36.4 (18 Jul 2019 10:15), Max: 37.4 (17 Jul 2019 16:15)  T(F): 97.5 (18 Jul 2019 10:15), Max: 99.3 (17 Jul 2019 16:15)  HR: 69 (18 Jul 2019 10:15) (69 - 116)  BP: 155/78 (18 Jul 2019 10:15) (121/78 - 164/83)  BP(mean): 113 (17 Jul 2019 17:58) (105 - 117)  RR: 17 (18 Jul 2019 10:15) (12 - 19)  SpO2: 98% (18 Jul 2019 10:15) (93% - 100%)  Pleasant male.  Laying in bed.  NAD, AAOx3, no scleral icterus  Bibasilar crackles mild  Reg rate  +BS, soft, no organomegaly  +pulses, equal bl  Right arm in sling.    Labs:  Kappa 4.7  Lambda 2.7  SPEP normal  No monoclonal protein    Imaging:  Reviewed.

## 2019-07-18 NOTE — DISCHARGE NOTE PROVIDER - NSDCCPCAREPLAN_GEN_ALL_CORE_FT
PRINCIPAL DISCHARGE DIAGNOSIS  Diagnosis: Proximal humeral fracture  Assessment and Plan of Treatment: right shoulder

## 2019-07-18 NOTE — PROGRESS NOTE ADULT - SUBJECTIVE AND OBJECTIVE BOX
Neurology Follow up note    Name  NARCISO LYONS    HPI:  The patient is a 64 year old male sent to the ED from Dr. Orozco's office for a right proximal humerus pathologic fracture. The patient states that he had an injury at work one month ago where he dropped a table and felt a "pull" in his right shoulder. The patient states he had pain in his right shoulder and then recently felt a "pop" in his right shoulder while he was driving which prompted a visit to orthopedist Dr. Mueller; patient had outpatient imaging and was ultimately sent to Dr. Orozco for definitive management. The patient denies pain in any other joints, fever/chills, numbness/tingling to the upper extremities. (16 Jul 2019 12:32)      Interval History - right UE weakness with tingling in the right hand         REVIEW OF SYSTEMS    Vital Signs Last 24 Hrs  T(C): 36.4 (18 Jul 2019 10:15), Max: 37.4 (17 Jul 2019 16:15)  T(F): 97.5 (18 Jul 2019 10:15), Max: 99.3 (17 Jul 2019 16:15)  HR: 69 (18 Jul 2019 10:15) (69 - 116)  BP: 155/78 (18 Jul 2019 10:15) (121/78 - 164/83)  BP(mean): 113 (17 Jul 2019 17:58) (105 - 117)  RR: 17 (18 Jul 2019 10:15) (12 - 19)  SpO2: 98% (18 Jul 2019 10:15) (93% - 100%)    Physical Exam-     Mental Status-  awake and alert     Cranial Nerves- full EOM    Gait and station- n/a    Motor- moves all 4 extremities - proximal weakness RUE and paresthesias to the right hand     Reflexes- decreased    Sensation- no sensory level    Coordination- no tremors     Vascular - no bruits    Medications  acetaminophen   Tablet .. 650 milliGRAM(s) Oral every 6 hours PRN  aspirin 325 milliGRAM(s) Oral daily  BUpivacaine liposome 1.3% Injectable (no eMAR) 20 milliLiter(s) Local Injection once  dextrose 40% Gel 15 Gram(s) Oral once PRN  dextrose 5%. 1000 milliLiter(s) IV Continuous <Continuous>  dextrose 50% Injectable 12.5 Gram(s) IV Push once  dextrose 50% Injectable 25 Gram(s) IV Push once  dextrose 50% Injectable 25 Gram(s) IV Push once  docusate sodium 100 milliGRAM(s) Oral three times a day  glucagon  Injectable 1 milliGRAM(s) IntraMuscular once PRN  HYDROmorphone  Injectable 0.5 milliGRAM(s) IV Push every 4 hours PRN  HYDROmorphone  Injectable 0.5 milliGRAM(s) IV Push every 15 minutes PRN  insulin lispro (HumaLOG) corrective regimen sliding scale   SubCutaneous Before meals and at bedtime  lactated ringers. 1000 milliLiter(s) IV Continuous <Continuous>  ondansetron Injectable 4 milliGRAM(s) IV Push every 6 hours PRN  oxyCODONE    IR 10 milliGRAM(s) Oral every 4 hours PRN  oxyCODONE    IR 5 milliGRAM(s) Oral every 4 hours PRN  senna 2 Tablet(s) Oral at bedtime      Lab      Radiology    Assessment- R/O brachial plexus involvment    Plan  as per ortho

## 2019-07-18 NOTE — PHYSICAL THERAPY INITIAL EVALUATION ADULT - RANGE OF MOTION EXAMINATION, REHAB EVAL
Left UE ROM was WNL (within normal limits)/bilateral lower extremity was ROM was WNL (within normal limits)/Right UE not tested

## 2019-07-18 NOTE — PROGRESS NOTE ADULT - SUBJECTIVE AND OBJECTIVE BOX
Chief Complaint/Reason for Consult: periop cv mgmt  INTERVAL HPI: post op s complications  	  MEDICATIONS:        acetaminophen   Tablet .. 650 milliGRAM(s) Oral every 6 hours PRN  aspirin 325 milliGRAM(s) Oral daily  HYDROmorphone  Injectable 0.5 milliGRAM(s) IV Push every 4 hours PRN  HYDROmorphone  Injectable 0.5 milliGRAM(s) IV Push every 15 minutes PRN  ondansetron Injectable 4 milliGRAM(s) IV Push every 6 hours PRN  oxyCODONE    IR 10 milliGRAM(s) Oral every 4 hours PRN  oxyCODONE    IR 5 milliGRAM(s) Oral every 4 hours PRN    docusate sodium 100 milliGRAM(s) Oral three times a day  senna 2 Tablet(s) Oral at bedtime    dextrose 40% Gel 15 Gram(s) Oral once PRN  dextrose 50% Injectable 12.5 Gram(s) IV Push once  dextrose 50% Injectable 25 Gram(s) IV Push once  dextrose 50% Injectable 25 Gram(s) IV Push once  glucagon  Injectable 1 milliGRAM(s) IntraMuscular once PRN  insulin lispro (HumaLOG) corrective regimen sliding scale   SubCutaneous Before meals and at bedtime    dextrose 5%. 1000 milliLiter(s) IV Continuous <Continuous>  lactated ringers. 1000 milliLiter(s) IV Continuous <Continuous>      REVIEW OF SYSTEMS:  [x] As per HPI  CONSTITUTIONAL: No fever, weight loss, or fatigue  RESPIRATORY: No cough, wheezing, chills or hemoptysis; No Shortness of Breath  CARDIOVASCULAR: No chest pain, palpitations, dizziness, or leg swelling  GASTROINTESTINAL: No abdominal or epigastric pain. No nausea, vomiting, or hematemesis; No diarrhea or constipation. No melena or hematochezia.  MUSCULOSKELETAL: No joint pain or swelling; No muscle, back, or extremity pain  [x] All others negative	  [ ] Unable to obtain    PHYSICAL EXAM:  T(C): 36.4 (07-18-19 @ 10:15), Max: 37.4 (07-17-19 @ 16:15)  HR: 69 (07-18-19 @ 10:15) (69 - 116)  BP: 155/78 (07-18-19 @ 10:15) (121/78 - 164/83)  RR: 17 (07-18-19 @ 10:15) (12 - 19)  SpO2: 98% (07-18-19 @ 10:15) (93% - 100%)  Wt(kg): --  I&O's Summary    17 Jul 2019 07:01  -  18 Jul 2019 07:00  --------------------------------------------------------  IN: 2150 mL / OUT: 1897 mL / NET: 253 mL          Appearance: Normal	  HEENT:   Normal oral mucosa  Cardiovascular: Normal S1 S2, No JVD, No murmurs, No edema  Respiratory: Lungs clear to auscultation	  Gastrointestinal:  Soft, Non-tender, + BS	  Extremities: Normal range of motion, No clubbing, cyanosis or edema  Vascular: Peripheral pulses palpable 2+ bilaterally    TELEMETRY: 	    ECG:   	  RADIOLOGY:   CXR:  CT:  US:    CARDIAC TESTING:  Echocardiogram:   Catheterization:  Stress Test:      LABS:	 	    CARDIAC MARKERS:                                  12.1   12.59 )-----------( 355      ( 18 Jul 2019 05:58 )             39.0     07-18    133<L>  |  96  |  8   ----------------------------<  229<H>  3.9   |  26  |  0.53    Ca    8.8      18 Jul 2019 05:58    TPro  x   /  Alb  4.1  /  TBili  x   /  DBili  x   /  AST  x   /  ALT  x   /  AlkPhos  x   07-17    proBNP:   Lipid Profile:   HgA1c:   TSH:     ASSESSMENT/PLAN: 	    # Post op s complications  no cp sob palp  no s/s acs or decomepnsation  not on BB - no need to initiate pre op      #CAD - only CRF is DM  EKG NSR  no murmur on exam    #HTN - at goal    #DM - mgmt per PCP

## 2019-07-18 NOTE — DISCHARGE NOTE PROVIDER - NSDCACTIVITY_GEN_ALL_CORE
Do not make important decisions/No heavy lifting/straining/Do not drive or operate machinery/Walking - Indoors allowed/Walking - Outdoors allowed Do not drive or operate machinery/Do not make important decisions/Walking - Indoors allowed/No heavy lifting/straining/Showering allowed/Walking - Outdoors allowed

## 2019-07-18 NOTE — DISCHARGE NOTE PROVIDER - NSDCFUADDINST_GEN_ALL_CORE_FT
You are non weight bearing in your right arm and must keep it in a sling at all times you may remove while showering.  No strenuous activity, heavy lifting, driving or returning to work until cleared by MD.  You may shower - dressing is water-resistant, no soaking in bathtubs.  Remove dressing after post op day 5-7, then leave incision open to air. Keep incision clean and dry.  Try to have regular bowel movements, take stool softener or laxative if necessary.  May take Pepcid or Zantac for upset stomach.  Swelling may travel all the way down your arm to your hand, this is normal and will subside in a few weeks.  Call to schedule an appt with Dr. Orozco for follow up, if you have staples or sutures they will be removed in office.  Call to schedule appointment with Dr. Regalado for 7/25/2019.   Contact your doctor if you experience: fever greater than 101.5, chills, chest pain, difficulty breathing, redness or excessive drainage around the incision, other concerns.  Follow up with your primary care provider. You are non weight bearing in your right arm and must keep it in a sling at all times. You may remove while showering.  No strenuous activity, heavy lifting, driving or returning to work until cleared by MD.  You may shower.  Leave incision open to air. Keep incision clean and dry.  Try to have regular bowel movements, take stool softener or laxative if necessary.  May take Pepcid or Zantac for upset stomach.  Swelling may travel all the way down your arm to your hand, this is normal and will subside in a few weeks.  You have an appointment with Dr. Orozco for follow up on Monday, 7/22/2019, your sutures will be removed in the office when wound is healed.  Call to schedule an appointment with Dr. Regalado for 7/25/2019.   Contact Dr. Orozco if you experience: fever greater than 101.5, chills, chest pain, difficulty breathing, redness or excessive drainage around the incision, other concerns.  Follow up with your primary care provider. You are non weight bearing in your right arm and must keep it in a sling at all times. You may remove while showering.  No strenuous activity, heavy lifting, driving or returning to work until cleared by MD.  You may shower, but don't get wound or dressing wet.  Try to have regular bowel movements, take stool softener or laxative if necessary.  May take Pepcid or Zantac for upset stomach.  Swelling may travel all the way down your arm to your hand, this is normal and will subside in a few weeks.  You have an appointment with Dr. Orozco for follow up on Monday, 7/22/2019, your sutures will be removed in the office when wound is healed.  Call to schedule an appointment with Dr. Regalado for 7/25/2019.   Contact Dr. Orozco if you experience: fever greater than 101.5, chills, chest pain, difficulty breathing, redness or excessive drainage around the incision, other concerns.  Follow up with your primary care provider.

## 2019-07-18 NOTE — DISCHARGE NOTE PROVIDER - NSDCCPTREATMENT_GEN_ALL_CORE_FT
PRINCIPAL PROCEDURE  Procedure: Shoulder hemiarthroplasty  Findings and Treatment:       SECONDARY PROCEDURE  Procedure: Shoulder hemiarthroplasty  Findings and Treatment:

## 2019-07-18 NOTE — PROGRESS NOTE ADULT - SUBJECTIVE AND OBJECTIVE BOX
64 year old male sent to the ED from Dr. Orozco's office for a right proximal humerus pathologic fracture. The patient states that he had an injury at work one month ago where he dropped a table and felt a "pull" in his right shoulder. The patient states he had pain in his right shoulder and then recently felt a "pop" in his right shoulder while he was driving which prompted a visit to orthopedist Dr. Mueller; patient had outpatient imaging and was ultimately sent to Dr. Orozco for definitive management. The patient denies pain in any other joints, fever/chills, numbness/tingling to the upper extremities.       Patient History:   Past Medical, Past Surgical, and Family History:  PAST MEDICAL HISTORY:  Type 2 diabetes mellitus.     PAST SURGICAL HISTORY:  H/O foot surgery.          	  MEDICATIONS:        acetaminophen   Tablet .. 650 milliGRAM(s) Oral every 6 hours PRN  aspirin 325 milliGRAM(s) Oral daily  HYDROmorphone  Injectable 0.5 milliGRAM(s) IV Push every 4 hours PRN  HYDROmorphone  Injectable 0.5 milliGRAM(s) IV Push every 15 minutes PRN  ondansetron Injectable 4 milliGRAM(s) IV Push every 6 hours PRN  oxyCODONE    IR 10 milliGRAM(s) Oral every 4 hours PRN  oxyCODONE    IR 5 milliGRAM(s) Oral every 4 hours PRN    docusate sodium 100 milliGRAM(s) Oral three times a day  senna 2 Tablet(s) Oral at bedtime    dextrose 40% Gel 15 Gram(s) Oral once PRN  dextrose 50% Injectable 12.5 Gram(s) IV Push once  dextrose 50% Injectable 25 Gram(s) IV Push once  dextrose 50% Injectable 25 Gram(s) IV Push once  glucagon  Injectable 1 milliGRAM(s) IntraMuscular once PRN  insulin lispro (HumaLOG) corrective regimen sliding scale   SubCutaneous Before meals and at bedtime    dextrose 5%. 1000 milliLiter(s) IV Continuous <Continuous>  lactated ringers. 1000 milliLiter(s) IV Continuous <Continuous>      Complaint:     Otherwise 12 point review of systems is normal.    PHYSICAL EXAM:    Constitutional: NAD  Eyes: PERRL, EOMI, sclera non-icteric  Neck: supple, no masses, no JVD  Respiratory: CTA b/l, good air entry b/l, no wheezing, rhonchi, rales, with normal respiratory effort and no intercostal retractions  Cardiovascular: RRR, normal S1S2, no M/R/G  Gastrointestinal: soft, NTND, no masses palpable, BS normal in all four quadrants,   Extremities:  no c/c/e  Neurological: AAOx3    ICU Vital Signs Last 24 Hrs  T(C): 37.4 (18 Jul 2019 16:33), Max: 37.4 (18 Jul 2019 16:33)  T(F): 99.4 (18 Jul 2019 16:33), Max: 99.4 (18 Jul 2019 16:33)  HR: 104 (18 Jul 2019 16:33) (69 - 104)  BP: 134/79 (18 Jul 2019 16:33) (121/78 - 156/87)  BP(mean): --  ABP: --  ABP(mean): --  RR: 17 (18 Jul 2019 16:33) (17 - 18)  SpO2: 95% (18 Jul 2019 16:33) (95% - 100%)          LABS:	 	  CARDIAC MARKERS:                        12.1   12.59 )-----------( 355      ( 18 Jul 2019 05:58 )             39.0     07-18    133<L>  |  96  |  8   ----------------------------<  229<H>  3.9   |  26  |  0.53    Ca    8.8      18 Jul 2019 05:58    TPro  6.2  /  Alb  3.1<L>  /  TBili  x   /  DBili  x   /  AST  x   /  ALT  x   /  AlkPhos  x   07-18    proBNP:   Lipid Profile:   HgA1c: Hemoglobin A1C, Whole Blood: 10.5 % (07-17 @ 06:32)    ASSESSMENT/PLAN: 	  A/P: 64yMale POD#1 s/p right shoulder hemiarthroplasty  - dispo: home tomorrow   - d/c renea

## 2019-07-18 NOTE — OCCUPATIONAL THERAPY INITIAL EVALUATION ADULT - FINE MOTOR COORDINATION, RIGHT HAND, MANIPULATE OBJECTS, OT EVAL
mild impairment/Patient able to stabilize objects in right hand (ie hold bottle and unscrew cap with left hand)

## 2019-07-18 NOTE — PROGRESS NOTE ADULT - SUBJECTIVE AND OBJECTIVE BOX
SUBJECTIVE: Patient seen and examined. Pain controlled.      OBJECTIVE:  NAD  Vital Signs Last 24 Hrs  T(C): 37.2 (18 Jul 2019 05:19), Max: 37.4 (17 Jul 2019 16:15)  T(F): 99 (18 Jul 2019 05:19), Max: 99.3 (17 Jul 2019 16:15)  HR: 100 (18 Jul 2019 05:19) (69 - 116)  BP: 156/87 (18 Jul 2019 05:19) (121/78 - 164/83)  BP(mean): 113 (17 Jul 2019 17:58) (105 - 117)  RR: 18 (18 Jul 2019 05:19) (12 - 19)  SpO2: 97% (18 Jul 2019 05:19) (93% - 100%)    Affected extremity:          Dressing: clean/dry/intact , sling intact         Sensation: SILT         Motor exam: firing FDS/IO/APL/EPL         warm well perfused; capillary refill <3 seconds                         12.1   12.59 )-----------( 355      ( 18 Jul 2019 05:58 )             39.0     07-18    133<L>  |  96  |  8   ----------------------------<  229<H>  3.9   |  26  |  0.53    Ca    8.8      18 Jul 2019 05:58    TPro  x   /  Alb  4.1  /  TBili  x   /  DBili  x   /  AST  x   /  ALT  x   /  AlkPhos  x   07-17        A/P :  Pt is a 63yo Male s/p R shoulder hemiathroplasty, prox humerus resection 7/18  -    Pain control  -    DVT ppx: SCD/ASA  -    Appreciate heme/onc recs  -   appreciate med recs  -   f/u skeletal survey  -    Weight bearing status: NWB  -    Physical Therapy  -    Dispo: JUAN C Gonzalez MD  PGY -4  Orthopaedic Surgery

## 2019-07-18 NOTE — PROGRESS NOTE ADULT - SUBJECTIVE AND OBJECTIVE BOX
Ortho Note    Pt seen and examined at bedside this AM  Patient reports pain at the right shoulder and some diminished sensation   Denies CP, SOB, N/V    Vital Signs Last 24 Hrs  T(C): 36.4 (07-18-19 @ 10:15), Max: 36.4 (07-18-19 @ 10:15)  T(F): 97.5 (07-18-19 @ 10:15), Max: 97.5 (07-18-19 @ 10:15)  HR: 69 (07-18-19 @ 10:15) (69 - 69)  BP: 155/78 (07-18-19 @ 10:15) (155/78 - 155/78)  BP(mean): --  RR: 17 (07-18-19 @ 10:15) (17 - 17)  SpO2: 98% (07-18-19 @ 10:15) (98% - 98%)  AVSS    General: Pt Alert and oriented, NAD  Dressing C/D/I with aquacel right anterior shoulder  HV x1 in place   Pulses: 2+ radial pulse   Sensation: grossly intact   subjectively decreased right hand   Motor: not tested                           12.1   12.59 )-----------( 355      ( 18 Jul 2019 05:58 )             39.0   18 Jul 2019 05:58    133    |  96     |  8      ----------------------------<  229    3.9     |  26     |  0.53       TPro  x      /  Alb  4.1    /  TBili  x      /  DBili  x      /  AST  x      /  ALT  x      /  AlkPhos  x      17 Jul 2019 01:10      A/P: 64yMale POD#1 s/p right shoulder hemiarthroplasty    - plan of Care reviewed with hematology/oncology: PET to be done on outpatient basis, outpatient f/u 1 week   - discussed with neurology- possible brachial plexus involvement- no acute intervention indicated- can monitor progress post operatively on outpatient basis   - Pain Control: continue as needed   - DVT ppx: on ASA 325mg   - PT, WBS: NWB RUE s/p shoulder hemiarthroplasty   - d/c renea   - dispo: home today vs tomorrow pending OT/PT progress     Ortho Pager [phone]

## 2019-07-18 NOTE — DISCHARGE NOTE PROVIDER - CARE PROVIDER_API CALL
Denny Orozco)  Orthopaedic Surgery  [address] [place] [address]  Phone: [phone]  Fax: [phone]  Follow Up Time:     Murtaza Regalado)  Internal Medicine; Medical Oncology  [address], [address]  [place] [address]  Phone: [phone]  Fax: [phone]  Follow Up Time: Denny Orozco)  Orthopaedic Surgery  [address] [place] [address]  Phone: [phone]  Fax: [phone]  Follow Up Time:     Murtaza Regalado)  Internal Medicine; Medical Oncology  [address], [address]  [place] [address]  Phone: [phone]  Fax: [phone]  Follow Up Time:

## 2019-07-18 NOTE — DISCHARGE NOTE PROVIDER - HOSPITAL COURSE
Admitted    Surgery 7/17 right shoulder hemiarthroplasty    [place]-op Antibiotics    Pain control    DVT prophylaxis    OOB/Physical Therapy     Hematology oncology consult    Medicine consult    Cardiology consult    Neurology consult

## 2019-07-18 NOTE — OCCUPATIONAL THERAPY INITIAL EVALUATION ADULT - PERTINENT HX OF CURRENT PROBLEM, REHAB EVAL
Patient admitted with right humerus pathologic fracture. S/P Right humerus open reduction internal fixation 7/17/19.

## 2019-07-18 NOTE — OCCUPATIONAL THERAPY INITIAL EVALUATION ADULT - GENERAL OBSERVATIONS, REHAB EVAL
Patient cleared for OT evaluation by YOLIS Laureano. Patient received semi-nunez, NAD, +RUE sling, +hemovac, +[place] cath, +cryocuff right shoulder.

## 2019-07-19 ENCOUNTER — TRANSCRIPTION ENCOUNTER (OUTPATIENT)
Age: 64
End: 2019-07-19

## 2019-07-19 VITALS
SYSTOLIC BLOOD PRESSURE: 143 MMHG | RESPIRATION RATE: 16 BRPM | DIASTOLIC BLOOD PRESSURE: 85 MMHG | HEART RATE: 84 BPM | TEMPERATURE: 98 F | OXYGEN SATURATION: 98 %

## 2019-07-19 LAB
ANION GAP SERPL CALC-SCNC: 13 MMOL/L — SIGNIFICANT CHANGE UP (ref 5–17)
BUN SERPL-MCNC: 9 MG/DL — SIGNIFICANT CHANGE UP (ref 7–23)
CALCIUM SERPL-MCNC: 9.4 MG/DL — SIGNIFICANT CHANGE UP (ref 8.4–10.5)
CHLORIDE SERPL-SCNC: 94 MMOL/L — LOW (ref 96–108)
CO2 SERPL-SCNC: 27 MMOL/L — SIGNIFICANT CHANGE UP (ref 22–31)
CREAT SERPL-MCNC: 0.47 MG/DL — LOW (ref 0.5–1.3)
DEPRECATED KAPPA LC FREE/LAMBDA SER: 13.26 — HIGH (ref 2.04–10.37)
GLUCOSE SERPL-MCNC: 198 MG/DL — HIGH (ref 70–99)
HCT VFR BLD CALC: 40 % — SIGNIFICANT CHANGE UP (ref 39–50)
HGB BLD-MCNC: 12.5 G/DL — LOW (ref 13–17)
KAPPA LC UR-MCNC: 423 MG/L — HIGH (ref 1.35–24.19)
LAMBDA LC UR-MCNC: 31.9 MG/L — HIGH (ref 0.24–6.66)
MCHC RBC-ENTMCNC: 26.7 PG — LOW (ref 27–34)
MCHC RBC-ENTMCNC: 31.3 GM/DL — LOW (ref 32–36)
MCV RBC AUTO: 85.5 FL — SIGNIFICANT CHANGE UP (ref 80–100)
NRBC # BLD: 0 /100 WBCS — SIGNIFICANT CHANGE UP (ref 0–0)
PLATELET # BLD AUTO: 348 K/UL — SIGNIFICANT CHANGE UP (ref 150–400)
POTASSIUM SERPL-MCNC: 4 MMOL/L — SIGNIFICANT CHANGE UP (ref 3.5–5.3)
POTASSIUM SERPL-SCNC: 4 MMOL/L — SIGNIFICANT CHANGE UP (ref 3.5–5.3)
RBC # BLD: 4.68 M/UL — SIGNIFICANT CHANGE UP (ref 4.2–5.8)
RBC # FLD: 12.9 % — SIGNIFICANT CHANGE UP (ref 10.3–14.5)
SODIUM SERPL-SCNC: 134 MMOL/L — LOW (ref 135–145)
WBC # BLD: 14.73 K/UL — HIGH (ref 3.8–10.5)
WBC # FLD AUTO: 14.73 K/UL — HIGH (ref 3.8–10.5)

## 2019-07-19 RX ORDER — OXYCODONE HYDROCHLORIDE 5 MG/1
1 TABLET ORAL
Qty: 18 | Refills: 0
Start: 2019-07-19 | End: 2019-07-21

## 2019-07-19 RX ADMIN — OXYCODONE HYDROCHLORIDE 10 MILLIGRAM(S): 5 TABLET ORAL at 12:40

## 2019-07-19 RX ADMIN — Medication 325 MILLIGRAM(S): at 12:09

## 2019-07-19 RX ADMIN — Medication 100 MILLIGRAM(S): at 12:10

## 2019-07-19 RX ADMIN — Medication 4: at 13:00

## 2019-07-19 RX ADMIN — Medication 2: at 07:38

## 2019-07-19 RX ADMIN — OXYCODONE HYDROCHLORIDE 10 MILLIGRAM(S): 5 TABLET ORAL at 12:16

## 2019-07-19 NOTE — PROGRESS NOTE ADULT - ASSESSMENT
A/P: 64yMale POD# 2 s/p right proximal humerus resection and hemiarthroplasty reconstruction.   He is diong very well. 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 No concern for Sarcoma. Due to a lesion being present in the glenoid, we elected to do a hemiarthroplasty at this time. Once the pathology is back, he will have radiation therapy. Subsequent to that, likely 2-3 months down the road we will convert to a reverse shoulder arthroplasty.  - Stable  - Pain/Nausea Controled  - DVT ppx: Aspirin 325  - WBS: as tolerated  - PT: gentle active and passive ROM  I will see him on Monday next week. Medical Oncology has follow up next thursday.
63 yo M with pathologic fracture of right shoulder. Admitted for surgery.    Pathologic fracture- lytic shoulder lesions.  High suspicion for malignancy.  Still unknown primary.  Will have pathology from shoulder.  Some pulm nodules which may be concerning.   Cystic liver lesion.  Renal lesion noted as well.  PET next week- discussed with Nuclear Medicine.  Follow up with me On Thursday next week.  MM work up reviewed- SPEP without monoclonal spike therefore unlikely MM unless oligo/non-secratory subtype.  Bone scan without other lesions.     Thank you  Murtaza Regalado MD

## 2019-07-19 NOTE — CHART NOTE - NSCHARTNOTEFT_GEN_A_CORE
Pt seen by RD prior to discharge.     63yo M with PMH of T2DM, right proximal humerus pathologic fracture. Now S/P Shoulder hemiarthroplasty 7/17. Skin: 1+ edema R arm 7/18, +surgical incision.   Pt was on cstCHO no snack diet w/ good PO intake. Reports good PO intake PTA, attempts to consume low sugar, moderate CHO diet PTA. Wt has been stable >6 months at 179lbs, current wt of 163.6lbs.     [facility] provided pt w/ handout, answered all questions. encouraged PO intake post op.

## 2019-07-19 NOTE — PROGRESS NOTE ADULT - SUBJECTIVE AND OBJECTIVE BOX
Ortho Note    Mr. Webb is comfortable without complaints, pain controlled Denies CP, SOB, N/V, numbness/tingling. His preoperative pain is gone. He has surgical pain as expected.   He is ready to discharge today.       Vital Signs Last 24 Hrs  T(C): 36.7 (07-19-19 @ 05:00), Max: 36.7 (07-19-19 @ 05:00)  T(F): 98.1 (07-19-19 @ 05:00), Max: 98.1 (07-19-19 @ 05:00)  HR: 90 (07-19-19 @ 05:00) (90 - 90)  BP: 149/90 (07-19-19 @ 05:00) (149/90 - 149/90)  BP(mean): --  RR: 17 (07-19-19 @ 05:00) (17 - 17)  SpO2: 98% (07-19-19 @ 05:00) (98% - 98%)    VSS  General: A&Ox3, NAD  RLE / LLE / RUE / LUE: Ace / Prevena / Calixto / Cryocuff / Aquacell / Gauze/Tegaderm DSG C/D/I  Pulses: Foot / Hand WWP; DP / PT / Radial pulse 2+; Cap refill < 2 sec  Sensation: SILT distally and symmetric to contralateral extremity  Motor: TA/EHL/FHL/GS 5/5 and symmetric to contralateral extremity  AIN/PIN/U motor 5/5 and symmetric to contralateral extremity                          12.1   12.59 )-----------( 355      ( 18 Jul 2019 05:58 )             39.0   18 Jul 2019 05:58    133    |  96     |  8      ----------------------------<  229    3.9     |  26     |  0.53       TPro  6.2    /  Alb  3.1

## 2019-07-19 NOTE — PROGRESS NOTE ADULT - REASON FOR ADMISSION
Pathologic proximal humerus fracture
Proximal Humerus Fracture secondary to neoplasm
Right UE weakness
Right UE weakness
right proximal humerus
shoulder surgery
Rt.proximal humerusFx.
+right shoulder pain

## 2019-07-19 NOTE — PROGRESS NOTE ADULT - SUBJECTIVE AND OBJECTIVE BOX
64 year old male sent to the ED from Dr. Orozco's office for a right proximal humerus pathologic fracture. The patient states that he had an injury at work one month ago where he dropped a table and felt a "pull" in his right shoulder. The patient states he had pain in his right shoulder and then recently felt a "pop" in his right shoulder while he was driving which prompted a visit to orthopedist Dr. Mueller; patient had outpatient imaging and was ultimately sent to Dr. Orozco for definitive management. The patient denies pain in any other joints, fever/chills, numbness/tingling to the upper extremities.       Patient History:   Past Medical, Past Surgical, and Family History:  PAST MEDICAL HISTORY:  Type 2 diabetes mellitus.     PAST SURGICAL HISTORY:  H/O foot surgery.          	  MEDICATIONS:        acetaminophen   Tablet .. 650 milliGRAM(s) Oral every 6 hours PRN  aspirin 325 milliGRAM(s) Oral daily  HYDROmorphone  Injectable 0.5 milliGRAM(s) IV Push every 4 hours PRN  HYDROmorphone  Injectable 0.5 milliGRAM(s) IV Push every 15 minutes PRN  ondansetron Injectable 4 milliGRAM(s) IV Push every 6 hours PRN  oxyCODONE    IR 10 milliGRAM(s) Oral every 4 hours PRN  oxyCODONE    IR 5 milliGRAM(s) Oral every 4 hours PRN    docusate sodium 100 milliGRAM(s) Oral three times a day  senna 2 Tablet(s) Oral at bedtime    dextrose 40% Gel 15 Gram(s) Oral once PRN  dextrose 50% Injectable 12.5 Gram(s) IV Push once  dextrose 50% Injectable 25 Gram(s) IV Push once  dextrose 50% Injectable 25 Gram(s) IV Push once  glucagon  Injectable 1 milliGRAM(s) IntraMuscular once PRN  insulin lispro (HumaLOG) corrective regimen sliding scale   SubCutaneous Before meals and at bedtime    dextrose 5%. 1000 milliLiter(s) IV Continuous <Continuous>  lactated ringers. 1000 milliLiter(s) IV Continuous <Continuous>      Complaint:     Otherwise 12 point review of systems is normal.    PHYSICAL EXAM:  Constitutional: NAD  Eyes: PERRL, EOMI, sclera non-icteric  Neck: supple, no masses, no JVD  Respiratory: CTA b/l, good air entry b/l, no wheezing, rhonchi, rales, with normal respiratory effort and no intercostal retractions  Cardiovascular: RRR, normal S1S2, no M/R/G  Gastrointestinal: soft, NTND, no masses palpable, BS normal in all four quadrants,   Extremities:  no c/c/e  Neurological: AAOx3  Vital Signs Last 24 Hrs    ICU Vital Signs Last 24 Hrs  T(C): 36.7 (19 Jul 2019 08:39), Max: 36.7 (19 Jul 2019 05:00)  T(F): 98 (19 Jul 2019 08:39), Max: 98.1 (19 Jul 2019 05:00)  HR: 84 (19 Jul 2019 08:39) (84 - 90)  BP: 143/85 (19 Jul 2019 08:39) (143/85 - 149/90)  BP(mean): --  ABP: --  ABP(mean): --  RR: 16 (19 Jul 2019 08:39) (16 - 17)  SpO2: 98% (19 Jul 2019 08:39) (98% -                            12.5   14.73 )-----------( 348      ( 19 Jul 2019 07:45 )             40.0     07-19    134<L>  |  94<L>  |  9   ----------------------------<  198<H>  4.0   |  27  |  0.47<L>    Ca    9.4      19 Jul 2019 07:45    TPro  6.2  /  Alb  3.1<L>  /  TBili  x   /  DBili  x   /  AST  x   /  ALT  x   /  AlkPhos  x   07-18    proBNP:   Lipid Profile:   HgA1c: Hemoglobin A1C, Whole Blood: 10.5 % (07-17 @ 06:32)     ASSESSMENT/PLAN: 	  Admitted  Surgery 7/17 right shoulder hemiarthroplasty  [place]-op Antibiotics  Pain control  DVT prophylaxis  OOB/Physical Therapy

## 2019-07-19 NOTE — PROGRESS NOTE ADULT - SUBJECTIVE AND OBJECTIVE BOX
Neurology Follow up note    Name  NARCISO LYONS    HPI:  The patient is a 64 year old male sent to the ED from Dr. Orozco's office for a right proximal humerus pathologic fracture. The patient states that he had an injury at work one month ago where he dropped a table and felt a "pull" in his right shoulder. The patient states he had pain in his right shoulder and then recently felt a "pop" in his right shoulder while he was driving which prompted a visit to orthopedist Dr. Mueller; patient had outpatient imaging and was ultimately sent to Dr. Orozco for definitive management. The patient denies pain in any other joints, fever/chills, numbness/tingling to the upper extremities. (16 Jul 2019 12:32)      Interval History - weakness RUE - no new weakness         REVIEW OF SYSTEMS    Vital Signs Last 24 Hrs  T(C): 36.7 (19 Jul 2019 08:39), Max: 37.5 (18 Jul 2019 20:36)  T(F): 98 (19 Jul 2019 08:39), Max: 99.5 (18 Jul 2019 20:36)  HR: 84 (19 Jul 2019 08:39) (84 - 104)  BP: 143/85 (19 Jul 2019 08:39) (134/79 - 149/90)  BP(mean): --  RR: 16 (19 Jul 2019 08:39) (16 - 17)  SpO2: 98% (19 Jul 2019 08:39) (95% - 98%)    Physical Exam-     Mental Status- awake and alert     Cranial Nerves- full EOM    Gait and station- n/a    Motor- RUE weakness    Reflexes- decrease right bicepts    Sensation- no sensory level    Coordination- no tremors    Vascular - no bruits    Medications  acetaminophen   Tablet .. 650 milliGRAM(s) Oral every 6 hours PRN  aspirin 325 milliGRAM(s) Oral daily  BUpivacaine liposome 1.3% Injectable (no eMAR) 20 milliLiter(s) Local Injection once  dextrose 40% Gel 15 Gram(s) Oral once PRN  dextrose 5%. 1000 milliLiter(s) IV Continuous <Continuous>  dextrose 50% Injectable 12.5 Gram(s) IV Push once  dextrose 50% Injectable 25 Gram(s) IV Push once  dextrose 50% Injectable 25 Gram(s) IV Push once  docusate sodium 100 milliGRAM(s) Oral three times a day  glucagon  Injectable 1 milliGRAM(s) IntraMuscular once PRN  HYDROmorphone  Injectable 0.5 milliGRAM(s) IV Push every 4 hours PRN  HYDROmorphone  Injectable 0.5 milliGRAM(s) IV Push every 15 minutes PRN  insulin lispro (HumaLOG) corrective regimen sliding scale   SubCutaneous Before meals and at bedtime  lactated ringers. 1000 milliLiter(s) IV Continuous <Continuous>  ondansetron Injectable 4 milliGRAM(s) IV Push every 6 hours PRN  oxyCODONE    IR 10 milliGRAM(s) Oral every 4 hours PRN  oxyCODONE    IR 5 milliGRAM(s) Oral every 4 hours PRN  senna 2 Tablet(s) Oral at bedtime      Lab      Radiology    Assessment- RUE weakness secondary to pain     Plan FU with ortho

## 2019-07-19 NOTE — PROGRESS NOTE ADULT - PROVIDER SPECIALTY LIST ADULT
Heme/Onc
Internal Medicine
Internal Medicine
Neurology
Neurology
Orthopedics
Cardiology
Orthopedics
Internal Medicine
Internal Medicine

## 2019-07-19 NOTE — DISCHARGE NOTE NURSING/CASE MANAGEMENT/SOCIAL WORK - NSDCDPATPORTLINK_GEN_ALL_CORE
You can access the [facility] Patient Portal, offered by [facility], by registering with the following website: [email or web address]

## 2019-07-19 NOTE — PROGRESS NOTE ADULT - SUBJECTIVE AND OBJECTIVE BOX
Ortho Note    Pt comfortable without complaints, pain controlled.  Denies CP, SOB, N/V, numbness/tingling.  denies fevers or chills.   PT recommended no home PT.     Vital Signs Last 24 Hrs  T(C): 36.7 (07-19-19 @ 05:00), Max: 36.7 (07-19-19 @ 05:00)  T(F): 98.1 (07-19-19 @ 05:00), Max: 98.1 (07-19-19 @ 05:00)  HR: 90 (07-19-19 @ 05:00) (90 - 90)  BP: 149/90 (07-19-19 @ 05:00) (149/90 - 149/90)  BP(mean): --  RR: 17 (07-19-19 @ 05:00) (17 - 17)  SpO2: 98% (07-19-19 @ 05:00) (98% - 98%)      General: Pt Alert and oriented, NAD  DSG C/D/I  Pulses: Cap refill <2 seconds  Sensation: SILT over m/u/r and axillary nerve distribution  Motor: cannot asses deltoid motor function at this time.   + wrist flexion and extension. + digit abduction, adduction, flexion and extension 5+.                           12.1   12.59 )-----------( 355      ( 18 Jul 2019 05:58 )             39.0   18 Jul 2019 05:58    133    |  96     |  8      ----------------------------<  229    3.9     |  26     |  0.53       TPro  6.2    /  Alb  3.1    /  TBili  x      /  DBili  x      /  AST  x      /  ALT  x      /  AlkPhos  x      18 Jul 2019 02:17      A/P: 64yMale POD# 2 s/p R shoulder brett-arthroplasty  - Stable  - Pain Control  - DVT ppx: Aspirin  - WBS: BAILEY CROUCHE  - Dispo today    Ortho Pager [phone]

## 2019-07-22 PROCEDURE — 71046 X-RAY EXAM CHEST 2 VIEWS: CPT

## 2019-07-22 PROCEDURE — 86334 IMMUNOFIX E-PHORESIS SERUM: CPT

## 2019-07-22 PROCEDURE — 80053 COMPREHEN METABOLIC PANEL: CPT

## 2019-07-22 PROCEDURE — 85730 THROMBOPLASTIN TIME PARTIAL: CPT

## 2019-07-22 PROCEDURE — 85027 COMPLETE CBC AUTOMATED: CPT

## 2019-07-22 PROCEDURE — 88342 IMHCHEM/IMCYTCHM 1ST ANTB: CPT

## 2019-07-22 PROCEDURE — 83036 HEMOGLOBIN GLYCOSYLATED A1C: CPT

## 2019-07-22 PROCEDURE — C1713: CPT

## 2019-07-22 PROCEDURE — 83521 IG LIGHT CHAINS FREE EACH: CPT

## 2019-07-22 PROCEDURE — 81001 URINALYSIS AUTO W/SCOPE: CPT

## 2019-07-22 PROCEDURE — 74176 CT ABD & PELVIS W/O CONTRAST: CPT

## 2019-07-22 PROCEDURE — 86900 BLOOD TYPING SEROLOGIC ABO: CPT

## 2019-07-22 PROCEDURE — 84165 PROTEIN E-PHORESIS SERUM: CPT

## 2019-07-22 PROCEDURE — 85610 PROTHROMBIN TIME: CPT

## 2019-07-22 PROCEDURE — 84295 ASSAY OF SERUM SODIUM: CPT

## 2019-07-22 PROCEDURE — 85025 COMPLETE CBC W/AUTO DIFF WBC: CPT

## 2019-07-22 PROCEDURE — 84132 ASSAY OF SERUM POTASSIUM: CPT

## 2019-07-22 PROCEDURE — 97116 GAIT TRAINING THERAPY: CPT

## 2019-07-22 PROCEDURE — 80048 BASIC METABOLIC PNL TOTAL CA: CPT

## 2019-07-22 PROCEDURE — C1776: CPT

## 2019-07-22 PROCEDURE — 97161 PT EVAL LOW COMPLEX 20 MIN: CPT

## 2019-07-22 PROCEDURE — 93005 ELECTROCARDIOGRAM TRACING: CPT

## 2019-07-22 PROCEDURE — 77075 RADEX OSSEOUS SURVEY COMPL: CPT

## 2019-07-22 PROCEDURE — 99285 EMERGENCY DEPT VISIT HI MDM: CPT | Mod: 25

## 2019-07-22 PROCEDURE — 80076 HEPATIC FUNCTION PANEL: CPT

## 2019-07-22 PROCEDURE — 78306 BONE IMAGING WHOLE BODY: CPT

## 2019-07-22 PROCEDURE — 36415 COLL VENOUS BLD VENIPUNCTURE: CPT

## 2019-07-22 PROCEDURE — 86901 BLOOD TYPING SEROLOGIC RH(D): CPT

## 2019-07-22 PROCEDURE — 82330 ASSAY OF CALCIUM: CPT

## 2019-07-22 PROCEDURE — 82962 GLUCOSE BLOOD TEST: CPT

## 2019-07-22 PROCEDURE — 88331 PATH CONSLTJ SURG 1 BLK 1SPC: CPT

## 2019-07-22 PROCEDURE — 85018 HEMOGLOBIN: CPT

## 2019-07-22 PROCEDURE — 84155 ASSAY OF PROTEIN SERUM: CPT

## 2019-07-22 PROCEDURE — 86850 RBC ANTIBODY SCREEN: CPT

## 2019-07-22 PROCEDURE — 73030 X-RAY EXAM OF SHOULDER: CPT

## 2019-07-22 PROCEDURE — 88341 IMHCHEM/IMCYTCHM EA ADD ANTB: CPT

## 2019-07-22 PROCEDURE — 82784 ASSAY IGA/IGD/IGG/IGM EACH: CPT

## 2019-07-22 PROCEDURE — 71250 CT THORAX DX C-: CPT

## 2019-07-22 PROCEDURE — 76000 FLUOROSCOPY <1 HR PHYS/QHP: CPT

## 2019-07-22 PROCEDURE — A9503: CPT

## 2019-07-24 DIAGNOSIS — C80.1 MALIGNANT (PRIMARY) NEOPLASM, UNSPECIFIED: ICD-10-CM

## 2019-07-24 DIAGNOSIS — R91.1 SOLITARY PULMONARY NODULE: ICD-10-CM

## 2019-07-24 DIAGNOSIS — F17.210 NICOTINE DEPENDENCE, CIGARETTES, UNCOMPLICATED: ICD-10-CM

## 2019-07-24 DIAGNOSIS — M84.421A PATHOLOGICAL FRACTURE, RIGHT HUMERUS, INITIAL ENCOUNTER FOR FRACTURE: ICD-10-CM

## 2019-07-24 DIAGNOSIS — E11.9 TYPE 2 DIABETES MELLITUS WITHOUT COMPLICATIONS: ICD-10-CM

## 2019-07-24 DIAGNOSIS — C79.51 SECONDARY MALIGNANT NEOPLASM OF BONE: ICD-10-CM

## 2019-07-24 DIAGNOSIS — M84.521A PATHOLOGICAL FRACTURE IN NEOPLASTIC DISEASE, RIGHT HUMERUS, INITIAL ENCOUNTER FOR FRACTURE: ICD-10-CM

## 2019-07-24 DIAGNOSIS — N28.9 DISORDER OF KIDNEY AND URETER, UNSPECIFIED: ICD-10-CM

## 2019-07-24 DIAGNOSIS — K76.9 LIVER DISEASE, UNSPECIFIED: ICD-10-CM

## 2020-08-07 PROBLEM — E11.9 TYPE 2 DIABETES MELLITUS WITHOUT COMPLICATIONS: Chronic | Status: ACTIVE | Noted: 2019-07-16

## 2020-08-07 PROBLEM — Z00.00 ENCOUNTER FOR PREVENTIVE HEALTH EXAMINATION: Status: ACTIVE | Noted: 2020-08-07

## 2020-08-10 ENCOUNTER — APPOINTMENT (OUTPATIENT)
Dept: CT IMAGING | Facility: HOSPITAL | Age: 65
End: 2020-08-10

## 2020-08-24 ENCOUNTER — APPOINTMENT (OUTPATIENT)
Dept: MRI IMAGING | Facility: HOSPITAL | Age: 65
End: 2020-08-24

## 2020-09-14 ENCOUNTER — APPOINTMENT (OUTPATIENT)
Dept: SURGICAL ONCOLOGY | Facility: CLINIC | Age: 65
End: 2020-09-14
Payer: MEDICARE

## 2020-09-14 ENCOUNTER — TRANSCRIPTION ENCOUNTER (OUTPATIENT)
Age: 65
End: 2020-09-14

## 2020-09-14 VITALS
HEIGHT: 68 IN | OXYGEN SATURATION: 98 % | BODY MASS INDEX: 26.67 KG/M2 | DIASTOLIC BLOOD PRESSURE: 84 MMHG | TEMPERATURE: 97.6 F | WEIGHT: 176 LBS | HEART RATE: 98 BPM | SYSTOLIC BLOOD PRESSURE: 126 MMHG

## 2020-09-14 DIAGNOSIS — Z85.46 PERSONAL HISTORY OF MALIGNANT NEOPLASM OF PROSTATE: ICD-10-CM

## 2020-09-14 DIAGNOSIS — Z86.39 PERSONAL HISTORY OF OTHER ENDOCRINE, NUTRITIONAL AND METABOLIC DISEASE: ICD-10-CM

## 2020-09-14 DIAGNOSIS — F17.200 NICOTINE DEPENDENCE, UNSPECIFIED, UNCOMPLICATED: ICD-10-CM

## 2020-09-14 DIAGNOSIS — Z87.898 PERSONAL HISTORY OF OTHER SPECIFIED CONDITIONS: ICD-10-CM

## 2020-09-14 DIAGNOSIS — Z80.9 FAMILY HISTORY OF MALIGNANT NEOPLASM, UNSPECIFIED: ICD-10-CM

## 2020-09-14 PROCEDURE — 99204 OFFICE O/P NEW MOD 45 MIN: CPT

## 2020-09-20 NOTE — ASSESSMENT
[FreeTextEntry1] : I) HCC\par \par P) Discussed w patient based on imaging report appears to have a resectable lesion but I do not have the images to review at this time. No mention of cirrhosis. Liver functions normal. In this setting would generally recommend resection as first choice as lesion is over 4 cm and appears to be a good surgery candidate. Patient will get us the films to review. As of now is scheduled for what sounds like an ablation or chemoembolization at [facility]. Will follow up after I have films to review. All questions answered.\par \par Matthew Hennessy MD\par \par Chief Surgical Oncology\par Multidisciplinary GI cancer program\par [facility] [place]\par [facility]\par \par Professor Surgery\par [employer]\par

## 2020-09-20 NOTE — RESULTS/DATA
[FreeTextEntry1] : Diagnostic Studies\par \par Date: 6/19/2020\par Study: MRI Abdomen WWO ([facility])\par Results: (1) 4.2 cm LR 5 (definite HCC) lesion within segment SAMMI (2) Mild hepatomegaly with diffuse steatosis. Several tiny arterial enhancing foci predominantly within the left lobe favored to represent vascular shunts. Attention on surveillance examination is suggested. (3) 4.5 cm septated hepatic cyst within segment IV. (4) 1.2 cm cystic lesion within the pancreatic body most likely representing a sidebranch intraductal papillary mucinous neoplasm. Further characterization with MRCP is recommended which can be obtained at the time of follow up liver imaging. (5) Foci of restricted diffusion within the lower thoracic vertebral body as described below, nonspecific. However, bone scan correlation is advised.\par \par Labs:\par Date: 8/26/2020\par Results: Alk Phos 79, TBili 0.2, ALT 34, AST 22, , H/H 14.0/41.5, \par \par Pathology: No new results to review\par \par \par

## 2020-09-20 NOTE — HISTORY OF PRESENT ILLNESS
[de-identified] : Patient Name: NARCISO LYONS \par MRN: [id number] \par [place] MRN:\par Referring Provider: Dr. Murtaza Regalado\par Oncologist: Dr. Murtaza Regalado\par Date: 9/14/2020\par \par Diagnosis: HCC\par \par 65 year male  presents for evaluation of HCC. He has a history of prostate cancer (localized and diagnosed in 2016, s/p RT only). \par In December 2019, outside imaging showed a liver lesion on which a biopsy was performed, positive for low grade HCC, approximately 3 cm in dimension. Surgery was cancelled due to COVID. MRI Liver was done in the [facility] in June 2020. He reportedly had another image study done at [facility] more recently. \par He also tells me he is scheduled for a liver procedure to place ports to receive chemo by a Dr. Blandon @ [facility].\par \par Currently, Mr. LYONS denies abdominal pain and discomfort, denies having decreased appetite, and denies nausea or vomiting. He denies changes in bowel habits and denies recent unintentional weight loss. He denies fevers, chills, or night sweats.\par \par Functional Status: Mr. LYONS is able to exercise without fatigue or dyspnea.\par \par

## 2020-09-20 NOTE — PHYSICAL EXAM
[Normal Neck Lymph Nodes] : normal neck lymph nodes  [Normal Supraclavicular Lymph Nodes] : normal supraclavicular lymph nodes [Normal] : grossly intact [de-identified] : Anicteric [de-identified] : S1,S2, regular rate and rhythm. No murmurs heard. [de-identified] : Clear throughout. No wheezes heard. [de-identified] : Warm, dry

## 2020-10-28 ENCOUNTER — NON-APPOINTMENT (OUTPATIENT)
Age: 65
End: 2020-10-28

## 2021-03-12 NOTE — PATIENT PROFILE ADULT - ARE SIGNIFICANT INDICATORS COMPLETE.
Patient discharged to home after ID bands verified and 's code alert removed. Discharge teaching complete, patient verbalizes understanding; questions encouraged. Infant placed in car seat correctly. Stable at discharge. Yes

## 2021-04-13 NOTE — PATIENT PROFILE ADULT - HAVE YOU EXPERIENCED A TRAUMATIC EVENT?
Call Center TCM Note      Responses   [facility] patient discharged from?  [facility]   Does the patient have one of the following disease processes/diagnoses(primary or secondary)?  Other   TCM attempt successful?  No   Unsuccessful attempts  Attempt 2          Noah Green RN    4/13/2021, 13:22 CDT       no

## 2021-06-26 NOTE — PATIENT PROFILE ADULT - NSPROGENDIFFINTUB_GEN_A_NUR
PHYSICAL THERAPY EVALUATION  NAME:  Davi Sevilla  DATE: 06/26/21    AGE:   78 y o    Mrn:   [id number]  ADMIT DX:  Recurrent UTI [N39 0]  Ambulatory dysfunction [R26 2]  Coronary artery disease involving native coronary artery of native heart without angina pectoris [I25 10]  Diabetes mellitus due to underlying condition with other skin ulcer (CODE) ([place] [address] ) [E08 622]  Hypertension, unspecified type [I10]  Pressure injury of left buttock, stage 2 ([facility]) [L89 322]  Pressure injury of right buttock, stage 2 ([facility]) [L89 312]  Stage 3 chronic kidney disease, unspecified whether stage 3a or 3b CKD ([place] [address] ) [N18 30]  Problem List:   Patient Active Problem List   Diagnosis    Paroxysmal atrial fibrillation ([place] [address] )    Benign essential hypertension    CAD (coronary artery disease)    Familial hypercholesteremia    Popliteal artery aneurysm ([place] [address] )    Carotid artery obstruction    NSTEMI (non-ST elevated myocardial infarction) with known CAD ([place] [address] )    Anemia    NSVT (nonsustained ventricular tachycardia) ([place] [address] )    Acute respiratory failure with hypoxia ([place] [address] )    Recurrent UTI    Ambulatory dysfunction    Hyperglycemia    CKD (chronic kidney disease) stage 3, GFR 30-59 ml/min ([facility])    Diabetes mellitus due to underlying condition with other skin ulcer (CODE) ([place] [address] )     Primary osteoarthritis of right hip    Medicare annual wellness visit, subsequent    Osteoarthritis of multiple joints    BMI 29 0-29 9,adult    Weakness    Pain in right lower leg    Hypertension    Hyperlipidemia    Pressure injury of left buttock, stage 2 ([facility] )    Pressure injury of right buttock, stage 2 ([facility] )    Suspected deep tissue injury    Edema    Diarrhea    Fall    Chin contusion, initial encounter    Abrasion of right arm, initial encounter         Length Of Stay: 0  Performed at least 2 patient identifiers during session: Name and ID bracelet  PHYSICAL THERAPY EVALUATION :        06/26/21 1012   PT Last Visit   PT Visit Date 06/26/21   Note Type   Note type Evaluation   Pain Assessment   Pain Assessment Tool 0-10   Pain Score 8   Pain Location/Orientation Orientation: Right;Location: Leg   Hospital Pain Intervention(s) Medication (See MAR)   Home Living   Type of [address] Equipment Shower chair;Commode   [facility]; Wheelchair-manual;Stair glide; Hospital bed   Additional Comments Pt lives in two story home with stair glide to second story where Bed/bath is  She has hospital bed    Prior Function   Level of [place] Needs assistance with ADLs and functional mobility   Lives With Daughter   Receives Help From Family;Personal care attendant  (daughter home at all times, personal care 3x/wk)   ADL Assistance Needs assistance   IADLs Needs assistance   Falls in the last 6 months 1 to 4   Comments Pts daughter is retired and is home to care for her  She requires assistance for rolling/getting in/out of bed, sometimes to stand up, she can walk short distances to commode, needs assistance for bathing, dressing, cooking, cleaning and all levels of ADLs/IADLs  Personal care attendent helps to shower 3x/wk   Restrictions/Precautions   Weight Bearing Precautions Per Order No   Other Precautions Chair Alarm; Bed Alarm;Multiple lines; Fall Risk;Pain;Hard of hearing   General   Additional Pertinent History Patient very [place]   Family/Caregiver Present No   Cognition   Overall Cognitive Status WFL   Orientation Level Oriented X4   Following Commands Follows one step commands without difficulty   RUE Assessment   RUE Assessment X  (grossly 3-/5 due to "bad shoulders ")   LUE Assessment   LUE Assessment X  (grossly 3-/5 due to "bad shoulders ")   RLE Assessment   RLE Assessment   (sig hip pain and knee pain limit function ankle 4-/5)   LLE Assessment   LLE Assessment   (ankle 2/5, hip and knee grossly 3-/5)   Bed Mobility   Rolling R 2  Maximal assistance   Additional items Assist x 1;Bedrails; Increased time required;Verbal cues   Rolling L 2  Maximal assistance   Additional items Assist x 1;Bedrails; Increased time required;Verbal cues   Supine to Sit 2  Maximal assistance   Additional items Assist x 1; Increased time required   Sit to Supine 2  Maximal assistance   Additional Comments Pt Max A for for all bed mobility due to hx of shoulder pain and limited mobility, as well as right leg pain  Max A to clean and gown change to clean patient patient after  failed pure whick   Transfers   Sit to Stand Unable to assess  (2/2 to right leg pain and patients inability to move leg)   Balance   Static Sitting Poor  (for gown change)   Endurance Deficit   Endurance Deficit Yes   Activity Tolerance   Activity Tolerance Patient limited by pain   Medical Staff Made Aware yes   Nurse Made Aware yes, RN Asaf Villafana provided patient with pain meds    Assessment   Prognosis Fair   Problem List Decreased strength;Decreased range of motion;Decreased endurance; Impaired balance;Decreased mobility; Impaired hearing; Impaired tone;Decreased skin integrity;Pain   Barriers to Discharge Decreased caregiver support   Barriers to Discharge Comments Patient currently non ambulatory and is Max A, may require more assist at home than family can provide    Goals   Patient Goals to be more independent    STG Expiration Date 07/05/21   Plan   Treatment/Interventions Functional transfer training;LE strengthening/ROM; Therapeutic exercise;Patient/family training;Equipment eval/education; Bed mobility;Gait training;Spoke to nursing   PT Frequency Other (Comment)  (3-5x/wk)   Recommendation   PT Discharge Recommendation Post acute rehabilitation services   PT - OK to Discharge Yes   Additional Comments when medically cleared to appropriate setting    Enriquechristina 8 in Bed Without Bedrails 2   Lying on Back to Sitting on Edge of Flat Bed 1   Moving Bed to Chair 1   Standing Up From Chair 1   Walk in Room 1   Climb 3-5 Stairs 1   Basic Mobility Inpatient Raw Score 7   Turning Head Towards Sound 3   Follow Simple Instructions 4   Low Function Basic Mobility Raw Score 14   Low Function Basic Mobility Standardized Score 22 01   Barthel Index   Feeding 5   Bathing 0   Grooming Score 0   Dressing Score 0   Bladder Score 5   Bowels Score 5   Toilet Use Score 0   Transfers (Bed/Chair) Score 0   Mobility (Level Surface) Score 0   Stairs Score 0   Barthel Index Score 15     (Please find full objective findings from PT assessment regarding body systems outlined above)  Assessment: Pt is a 78 y o  female seen for PT evaluation s/p admit to [facility] on 6/25/2021 w/ Recurrent UTI  Order placed for PT  Upon evaluation: Pt requires MaxA x 1 assistance for bed mobility  Pt's clinical presentation is currently evolving given the functional mobility deficits above, especially (but not limited to) weakness, decreased ROM, decreased skin integrity, decreased endurance, pain, decreased activity tolerance, decreased functional mobility tolerance and impaired tone, coupled with fall risks including hx of falls, and combined with medical complications of CAD, DM, HTN, pressure ulcers, ambualtory dysfunction  Pt to benefit from continued skilled PT tx while in hospital and upon DC to address deficits as defined above and maximize level of functional mobility  Recommend trial with walker next 1-2 sessions, ther ex next 1-2 sessions and case management consult, PT to see to assess transfers and ambulation  Goals: Pt will: Perform bed mobility tasks with consistent min A of 1 to prepare for transfers and reposition in bed  Transfers and ambulation to be assessed      The patient's AM-PAC Basic Mobility Inpatient Short Form Raw Score is 14  , Standardized Score is 22 01    A standardized score less than 42 9 suggests the patient may benefit from discharge to post-acute rehabilitation services, which coincide with above PT recommendations  However please refer to therapist recommendation for discharge planning given other factors that may influence destination  Adapted from Yesi Milton Use of 6-clicks to Provide Decision Support in the [facility] Setting  [address], [place]  [place] [facility]  Comorbidities affecting pt's physical performance at time of assessment include:  pain, ambulatory dysfunction, weakness,  has a past medical history of Anemia, Atrial fibrillation ([address] ), CAD (coronary artery disease), Cancer ([address] ), Carotid artery obstruction, Coronary artery disease, Disease of thyroid gland, GERD (gastroesophageal reflux disease), HL (hearing loss), Hyperlipidemia, Hypertension, Mesenteric ischemia, chronic ([address] ), and TIA (transient ischemic attack)  ,  has a past surgical history that includes Appendectomy; Cataract extraction; Coronary angioplasty; Cholecystectomy; Hysterectomy; Replacement total knee (Right); Coronary stent placement; Cardiac catheterization; Colonoscopy (02/26/2016); Angioplasty (01/01/2009); and Breast surgery (Right, 01/01/2005)    Personal factors affecting pt at time of IE include: limited home support, advanced age and inability to ambulate household distances  Portions of the record may have been created with voice recognition software  Occasional wrong word or "sound a like" substitutions may have occurred due to the inherent limitations of voice recognition software    Read the chart carefully and recognize, using context, where substitutions have occurred never intubated

## 2021-11-17 VITALS
BODY MASS INDEX: 26.52 KG/M2 | SYSTOLIC BLOOD PRESSURE: 122 MMHG | WEIGHT: 175 LBS | DIASTOLIC BLOOD PRESSURE: 78 MMHG | HEIGHT: 68 IN

## 2022-07-14 DIAGNOSIS — Z84.89 FAMILY HISTORY OF OTHER SPECIFIED CONDITIONS: ICD-10-CM

## 2022-07-14 DIAGNOSIS — Z82.61 FAMILY HISTORY OF ARTHRITIS: ICD-10-CM

## 2022-07-14 DIAGNOSIS — Z87.898 PERSONAL HISTORY OF OTHER SPECIFIED CONDITIONS: ICD-10-CM

## 2022-07-14 DIAGNOSIS — F17.210 NICOTINE DEPENDENCE, CIGARETTES, UNCOMPLICATED: ICD-10-CM

## 2022-07-14 DIAGNOSIS — Z86.79 PERSONAL HISTORY OF OTHER DISEASES OF THE CIRCULATORY SYSTEM: ICD-10-CM

## 2022-07-14 DIAGNOSIS — Z78.9 OTHER SPECIFIED HEALTH STATUS: ICD-10-CM

## 2022-07-14 DIAGNOSIS — Z81.1 FAMILY HISTORY OF ALCOHOL ABUSE AND DEPENDENCE: ICD-10-CM

## 2022-07-14 RX ORDER — BLOOD SUGAR DIAGNOSTIC
STRIP MISCELLANEOUS
Refills: 0 | Status: ACTIVE | COMMUNITY

## 2022-07-14 RX ORDER — CALCIUM CARBONATE/VITAMIN D2 500 MG-125
500-125 TABLET ORAL DAILY
Refills: 0 | Status: ACTIVE | COMMUNITY

## 2022-07-25 ENCOUNTER — APPOINTMENT (OUTPATIENT)
Dept: CARDIOLOGY | Facility: CLINIC | Age: 67
End: 2022-07-25

## 2022-07-25 ENCOUNTER — NON-APPOINTMENT (OUTPATIENT)
Age: 67
End: 2022-07-25

## 2022-07-25 PROCEDURE — 36415 COLL VENOUS BLD VENIPUNCTURE: CPT

## 2022-07-25 PROCEDURE — 93000 ELECTROCARDIOGRAM COMPLETE: CPT

## 2022-07-25 PROCEDURE — 99213 OFFICE O/P EST LOW 20 MIN: CPT | Mod: 25

## 2022-07-25 RX ORDER — ENZALUTAMIDE 80 MG/1
80 TABLET ORAL
Qty: 60 | Refills: 0 | Status: ACTIVE | COMMUNITY
Start: 2022-07-07

## 2022-07-25 RX ORDER — SULFAMETHOXAZOLE AND TRIMETHOPRIM 800; 160 MG/1; MG/1
800-160 TABLET ORAL TWICE DAILY
Refills: 0 | Status: COMPLETED | COMMUNITY
End: 2022-07-25

## 2022-07-25 NOTE — ASSESSMENT
[FreeTextEntry1] : Same meds\par Continue exercise\par RTO 3-4 mos Patient/Caregiver provided printed discharge information.

## 2022-07-25 NOTE — REASON FOR VISIT
[FreeTextEntry1] : Patient here for routine check. Has been following with Dr Vega. No specific complaint.

## 2022-07-25 NOTE — PHYSICAL EXAM
[Well Developed] : well developed [Normal Conjunctiva] : normal conjunctiva [Normal Venous Pressure] : normal venous pressure [No Carotid Bruit] : no carotid bruit [Normal S1, S2] : normal S1, S2 [No Murmur] : no murmur [Clear Lung Fields] : clear lung fields [Soft] : abdomen soft [Normal Gait] : normal gait [No Edema] : no edema

## 2022-08-05 LAB
ALBUMIN SERPL ELPH-MCNC: 4.7 G/DL
ALP BLD-CCNC: 80 U/L
ALT SERPL-CCNC: 44 U/L
ANION GAP SERPL CALC-SCNC: 12 MMOL/L
AST SERPL-CCNC: 35 U/L
BASOPHILS # BLD AUTO: 0.06 K/UL
BASOPHILS NFR BLD AUTO: 1 %
BILIRUB SERPL-MCNC: 0.3 MG/DL
BUN SERPL-MCNC: 14 MG/DL
CALCIUM SERPL-MCNC: 10.1 MG/DL
CHLORIDE SERPL-SCNC: 106 MMOL/L
CO2 SERPL-SCNC: 25 MMOL/L
CREAT SERPL-MCNC: 0.95 MG/DL
EGFR: 87 ML/MIN/1.73M2
EOSINOPHIL # BLD AUTO: 0.15 K/UL
EOSINOPHIL NFR BLD AUTO: 2.4 %
GLUCOSE SERPL-MCNC: 115 MG/DL
HCT VFR BLD CALC: 47.6 %
HGB BLD-MCNC: 14.7 G/DL
IMM GRANULOCYTES NFR BLD AUTO: 0.3 %
LYMPHOCYTES # BLD AUTO: 2.01 K/UL
LYMPHOCYTES NFR BLD AUTO: 32.4 %
MAN DIFF?: NORMAL
MCHC RBC-ENTMCNC: 27.8 PG
MCHC RBC-ENTMCNC: 30.9 GM/DL
MCV RBC AUTO: 90.2 FL
MONOCYTES # BLD AUTO: 0.8 K/UL
MONOCYTES NFR BLD AUTO: 12.9 %
NEUTROPHILS # BLD AUTO: 3.17 K/UL
NEUTROPHILS NFR BLD AUTO: 51 %
PLATELET # BLD AUTO: 304 K/UL
POTASSIUM SERPL-SCNC: 4.8 MMOL/L
PROT SERPL-MCNC: 7.9 G/DL
RBC # BLD: 5.28 M/UL
RBC # FLD: 15.1 %
SODIUM SERPL-SCNC: 142 MMOL/L
WBC # FLD AUTO: 6.21 K/UL

## 2022-08-08 LAB
CHOLEST SERPL-MCNC: 200 MG/DL
ESTIMATED AVERAGE GLUCOSE: 212 MG/DL
HBA1C MFR BLD HPLC: 9 %
HDLC SERPL-MCNC: 53 MG/DL
LDLC SERPL CALC-MCNC: 130 MG/DL
NONHDLC SERPL-MCNC: 147 MG/DL
TRIGL SERPL-MCNC: 85 MG/DL

## 2022-10-26 ENCOUNTER — APPOINTMENT (OUTPATIENT)
Dept: CARDIOLOGY | Facility: CLINIC | Age: 67
End: 2022-10-26

## 2022-10-26 ENCOUNTER — LABORATORY RESULT (OUTPATIENT)
Age: 67
End: 2022-10-26

## 2022-10-26 VITALS
HEIGHT: 68 IN | SYSTOLIC BLOOD PRESSURE: 126 MMHG | BODY MASS INDEX: 25.91 KG/M2 | DIASTOLIC BLOOD PRESSURE: 70 MMHG | WEIGHT: 171 LBS | OXYGEN SATURATION: 98 % | HEART RATE: 88 BPM

## 2022-10-26 DIAGNOSIS — Z01.818 ENCOUNTER FOR OTHER PREPROCEDURAL EXAMINATION: ICD-10-CM

## 2022-10-26 PROCEDURE — 36415 COLL VENOUS BLD VENIPUNCTURE: CPT

## 2022-10-26 PROCEDURE — 99214 OFFICE O/P EST MOD 30 MIN: CPT | Mod: 25

## 2022-10-26 PROCEDURE — 93000 ELECTROCARDIOGRAM COMPLETE: CPT

## 2022-10-26 PROCEDURE — ZZZZZ: CPT

## 2022-10-26 RX ORDER — LEUPROLIDE ACETATE 7.5 MG/.25ML
7.5 INJECTION, SUSPENSION, EXTENDED RELEASE SUBCUTANEOUS
Qty: 1 | Refills: 3 | Status: ACTIVE | COMMUNITY
Start: 2022-03-23

## 2022-10-26 RX ORDER — VIBEGRON 75 MG/1
75 TABLET, FILM COATED ORAL
Refills: 0 | Status: ACTIVE | COMMUNITY
Start: 2022-06-27

## 2022-10-26 RX ORDER — LEUPROLIDE ACETATE 1 MG/0.2ML
1 KIT SUBCUTANEOUS
Refills: 0 | Status: ACTIVE | COMMUNITY

## 2022-10-26 NOTE — PHYSICAL EXAM
[Well Developed] : well developed [Normal Conjunctiva] : normal conjunctiva [Normal Venous Pressure] : normal venous pressure [No Carotid Bruit] : no carotid bruit [Normal S1, S2] : normal S1, S2 [No Murmur] : no murmur [Clear Lung Fields] : clear lung fields [Soft] : abdomen soft [Normal Gait] : normal gait [No Edema] : no edema [No Focal Deficits] : no focal deficits

## 2022-10-27 LAB
ALBUMIN SERPL ELPH-MCNC: 4.3 G/DL
ALP BLD-CCNC: 96 U/L
ALT SERPL-CCNC: 116 U/L
ANION GAP SERPL CALC-SCNC: 13 MMOL/L
AST SERPL-CCNC: 74 U/L
BILIRUB SERPL-MCNC: 0.4 MG/DL
BUN SERPL-MCNC: 30 MG/DL
CALCIUM SERPL-MCNC: 10 MG/DL
CHLORIDE SERPL-SCNC: 104 MMOL/L
CHOLEST SERPL-MCNC: 169 MG/DL
CO2 SERPL-SCNC: 22 MMOL/L
CREAT SERPL-MCNC: 1.05 MG/DL
EGFR: 77 ML/MIN/1.73M2
ESTIMATED AVERAGE GLUCOSE: 143 MG/DL
GLUCOSE SERPL-MCNC: 113 MG/DL
HBA1C MFR BLD HPLC: 6.6 %
HCV AB SER QL: REACTIVE
HCV S/CO RATIO: 13.96 S/CO
HDLC SERPL-MCNC: 44 MG/DL
INR PPP: 1.08 RATIO
LDLC SERPL CALC-MCNC: 110 MG/DL
NONHDLC SERPL-MCNC: 125 MG/DL
POTASSIUM SERPL-SCNC: 4.7 MMOL/L
PROT SERPL-MCNC: 7.4 G/DL
PT BLD: 12.8 SEC
SODIUM SERPL-SCNC: 139 MMOL/L
TRIGL SERPL-MCNC: 72 MG/DL

## 2022-10-27 NOTE — REASON FOR VISIT
[FreeTextEntry1] : Patient needs clearance prior to oral surgery for osteonecrosis of the left maxilla.Patient has no chest pain, palpation, SOB.

## 2022-10-27 NOTE — ASSESSMENT
[FreeTextEntry1] : Labs in office today, ECG\par Clearance note if labs ok \par (Dr Fox, tel [phone], fax ???)\par \par Labs are satisfactory for surgery.\par PATIENT IS CLEARED FOR SURGERY AND ANESTHESIA OF CHOICE.

## 2022-10-28 LAB
BASOPHILS # BLD AUTO: 0.03 K/UL
BASOPHILS NFR BLD AUTO: 0.5 %
EOSINOPHIL # BLD AUTO: 0.1 K/UL
EOSINOPHIL NFR BLD AUTO: 1.8 %
HCT VFR BLD CALC: 43.3 %
HGB BLD-MCNC: 13.7 G/DL
IMM GRANULOCYTES NFR BLD AUTO: 0.2 %
LYMPHOCYTES # BLD AUTO: 1.66 K/UL
LYMPHOCYTES NFR BLD AUTO: 29.4 %
MAN DIFF?: NORMAL
MCHC RBC-ENTMCNC: 28 PG
MCHC RBC-ENTMCNC: 31.6 GM/DL
MCV RBC AUTO: 88.5 FL
MONOCYTES # BLD AUTO: 0.99 K/UL
MONOCYTES NFR BLD AUTO: 17.6 %
NEUTROPHILS # BLD AUTO: 2.85 K/UL
NEUTROPHILS NFR BLD AUTO: 50.5 %
PLATELET # BLD AUTO: 341 K/UL
RBC # BLD: 4.89 M/UL
RBC # FLD: 14 %
WBC # FLD AUTO: 5.64 K/UL

## 2022-11-03 PROBLEM — Z01.818 PREOP TESTING: Status: ACTIVE | Noted: 2022-10-26

## 2022-11-03 NOTE — REASON FOR VISIT
[FreeTextEntry1] : Reason For Visit  Patient needs clearance prior to oral surgery for osteonecrosis of the left maxilla.Patient has no chest pain, palpation, SOB.    Patient Care Team  Care Team Member	Role	Specialty	Office Number RAMIREZ BARTLETT MD,BENJI CALDWELL	Primary Care Provider	CARDIOVASCULAR DISEASE	[phone]  Active Problems Diabetes (250.00) (E11.9) Liver cancer (155.2) (C22.9) Prostate CA (185) (C61)  Past Medical History History of elevated prostate specific antigen (PSA) (V13.89) (Z87.898) History of prostate cancer (V10.46) (Z85.46) History of sinus tachycardia (V12.59) (Z86.79) History of type 2 diabetes mellitus (V12.29) (Z86.39) History of urinary frequency (V13.09) (Z87.898)  Current Meds ?• Eligard 7.5 MG Subcutaneous Kit ?• FreeStyle Lite Test STRP ?• Gemtesa 75 MG Oral Tablet; TAKE 1 Tablet BY MOUTH ONCE DAILY ?• glyBURIDE 2.5 MG Oral Tablet; TAKE 1 TABLET TWICE DAILY WITH MEALS ?• Janumet  MG Oral Tablet; Take 1 tablet twice daily ?• Lupron KIT ?• Oyster Calcium + D 500-125 MG-UNIT TABS; TAKE 1 TABLET DAILY ?• Xtandi 80 MG Oral Tablet  Allergies No Known Drug Allergies  Recorded By: DANNY PHILIP; 9/14/2020 1:17:35 PM  Review of Systems  Musculoskeletal: joint stiffness.  Constitutional, ENT, Cardiovascular, Respiratory, Gastrointestinal, Genitourinary, Integumentary and Neurological are otherwise negative.    Vitals Vital Signs  	Recorded: 26Oct2022 12:53PM Systolic	126 Diastolic	70 Height	5 ft 8 in Weight	171 lb  BMI Calculated	26 kg/m2 BSA Calculated	1.91 Heart Rate	88 O2 Saturation	98  Physical Exam  Constitutional:  well developed.  Eyes:  normal conjunctiva.  Neck:  normal venous pressure, no carotid bruit.  Cardiac:  normal S1, S2, no murmur.  Pulmonary:  clear lung fields.  Abdomen:  abdomen soft.  Musculoskeletal:  normal gait.  Extremities:  no edema.  Neurological:  no focal deficits.    Assessment Osteonecrosis, maxilla (733.40) (M87.9) Diabetes (250.00) (E11.9) Liver cancer (155.2) (C22.9) Prostate CA (185) (C61) Preop testing (V72.84) (Z01.818) HCV (hepatitis C virus) (070.70) (B19.20) Overactive bladder (596.51) (N32.81)  Labs in office today, ECG Clearance note if labs ok  (Dr Fox, tel [phone], fax ???)  Labs are satisfactory for surgery. PATIENT IS CLEARED FOR SURGERY AND ANESTHESIA OF CHOICE.     End of Encounter Meds ?• Eligard 7.5 MG Subcutaneous Kit ?• FreeStyle Lite Test STRP ?• Gemtesa 75 MG Oral Tablet; TAKE 1 Tablet BY MOUTH ONCE DAILY ?• glyBURIDE 2.5 MG Oral Tablet; TAKE 1 TABLET TWICE DAILY WITH MEALS ?• Janumet  MG Oral Tablet; Take 1 tablet twice daily ?• Lupron KIT ?• Oyster Calcium + D 500-125 MG-UNIT TABS; TAKE 1 TABLET DAILY ?• Xtandi 80 MG Oral Tablet  Electronically signed by : BENJI GUZMÁN JR, MD; Oct 27

## 2023-02-01 ENCOUNTER — RX RENEWAL (OUTPATIENT)
Age: 68
End: 2023-02-01

## 2023-02-01 RX ORDER — GLYBURIDE 2.5 MG/1
2.5 TABLET ORAL TWICE DAILY
Qty: 180 | Refills: 0 | Status: ACTIVE | COMMUNITY
Start: 2023-02-01 | End: 1900-01-01

## 2023-02-02 ENCOUNTER — APPOINTMENT (OUTPATIENT)
Dept: CARDIOLOGY | Facility: CLINIC | Age: 68
End: 2023-02-02
Payer: MEDICARE

## 2023-02-02 VITALS
HEIGHT: 68 IN | BODY MASS INDEX: 26.98 KG/M2 | SYSTOLIC BLOOD PRESSURE: 110 MMHG | DIASTOLIC BLOOD PRESSURE: 66 MMHG | HEART RATE: 115 BPM | WEIGHT: 178 LBS | OXYGEN SATURATION: 98 %

## 2023-02-02 DIAGNOSIS — N32.81 OVERACTIVE BLADDER: ICD-10-CM

## 2023-02-02 PROCEDURE — 99214 OFFICE O/P EST MOD 30 MIN: CPT | Mod: 25

## 2023-02-02 PROCEDURE — 93000 ELECTROCARDIOGRAM COMPLETE: CPT

## 2023-02-02 PROCEDURE — 93306 TTE W/DOPPLER COMPLETE: CPT

## 2023-02-02 PROCEDURE — 93242 EXT ECG>48HR<7D RECORDING: CPT

## 2023-02-02 NOTE — PHYSICAL EXAM
[Normal Venous Pressure] : normal venous pressure [No Carotid Bruit] : no carotid bruit [Normal S1, S2] : normal S1, S2 [Murmur] : murmur [Clear Lung Fields] : clear lung fields [Soft] : abdomen soft [Normal Gait] : normal gait [No Edema] : no edema

## 2023-02-02 NOTE — REASON FOR VISIT
[FreeTextEntry1] : Patient arrives for f/u. Had admission to [facility] 11/2022 with abdominal pain. Had diagnosis of acute cholecystitis. Surgery was planned then cancelled as patient improved. No pain since discharge. Urologist Dr Vega added solifenacin 5 mg qd

## 2023-02-09 PROCEDURE — 93244 EXT ECG>48HR<7D REV&INTERPJ: CPT

## 2023-03-02 ENCOUNTER — APPOINTMENT (OUTPATIENT)
Dept: CARDIOLOGY | Facility: CLINIC | Age: 68
End: 2023-03-02
Payer: MEDICARE

## 2023-03-02 VITALS
HEART RATE: 93 BPM | HEIGHT: 68 IN | SYSTOLIC BLOOD PRESSURE: 118 MMHG | WEIGHT: 177 LBS | OXYGEN SATURATION: 96 % | DIASTOLIC BLOOD PRESSURE: 70 MMHG | BODY MASS INDEX: 26.83 KG/M2

## 2023-03-02 DIAGNOSIS — M87.9 OSTEONECROSIS, UNSPECIFIED: ICD-10-CM

## 2023-03-02 PROCEDURE — 99213 OFFICE O/P EST LOW 20 MIN: CPT

## 2023-03-02 NOTE — REASON FOR VISIT
[FreeTextEntry1] : Patient is feeling well. Had Zio that revealed 6 episodes of short SVT in 6 days. No associated symptoms.

## 2023-03-02 NOTE — PHYSICAL EXAM
[No Acute Distress] : no acute distress [Normal Venous Pressure] : normal venous pressure [Normal S1, S2] : normal S1, S2 [Murmur] : murmur [Clear Lung Fields] : clear lung fields [Soft] : abdomen soft [Normal Gait] : normal gait [No Edema] : no edema

## 2023-04-17 ENCOUNTER — NON-APPOINTMENT (OUTPATIENT)
Age: 68
End: 2023-04-17

## 2023-06-02 ENCOUNTER — APPOINTMENT (OUTPATIENT)
Dept: CARDIOLOGY | Facility: CLINIC | Age: 68
End: 2023-06-02

## 2023-08-10 ENCOUNTER — RX RENEWAL (OUTPATIENT)
Age: 68
End: 2023-08-10

## 2023-09-13 ENCOUNTER — APPOINTMENT (OUTPATIENT)
Dept: CARDIOLOGY | Facility: CLINIC | Age: 68
End: 2023-09-13
Payer: MEDICARE

## 2023-09-13 DIAGNOSIS — B19.20 UNSPECIFIED VIRAL HEPATITIS C W/OUT HEPATIC COMA: ICD-10-CM

## 2023-09-13 PROCEDURE — 99213 OFFICE O/P EST LOW 20 MIN: CPT | Mod: 25

## 2023-09-13 PROCEDURE — 93000 ELECTROCARDIOGRAM COMPLETE: CPT

## 2023-09-14 LAB
ESTIMATED AVERAGE GLUCOSE: 189 MG/DL
HBA1C MFR BLD HPLC: 8.2 %

## 2023-09-15 LAB
ALBUMIN SERPL ELPH-MCNC: 4.7 G/DL
ALP BLD-CCNC: 97 U/L
ALT SERPL-CCNC: 28 U/L
ANION GAP SERPL CALC-SCNC: 18 MMOL/L
APPEARANCE: CLEAR
AST SERPL-CCNC: 33 U/L
BASOPHILS # BLD AUTO: 0.04 K/UL
BASOPHILS NFR BLD AUTO: 0.5 %
BILIRUB SERPL-MCNC: 0.3 MG/DL
BILIRUBIN URINE: NEGATIVE
BLOOD URINE: NEGATIVE
BUN SERPL-MCNC: 23 MG/DL
CALCIUM SERPL-MCNC: 10.4 MG/DL
CHLORIDE SERPL-SCNC: 100 MMOL/L
CHOLEST SERPL-MCNC: 209 MG/DL
CO2 SERPL-SCNC: 20 MMOL/L
COLOR: YELLOW
CREAT SERPL-MCNC: 1.01 MG/DL
EGFR: 80 ML/MIN/1.73M2
EOSINOPHIL # BLD AUTO: 0.05 K/UL
EOSINOPHIL NFR BLD AUTO: 0.6 %
GLUCOSE QUALITATIVE U: NEGATIVE MG/DL
GLUCOSE SERPL-MCNC: 155 MG/DL
HCT VFR BLD CALC: 44.4 %
HDLC SERPL-MCNC: 45 MG/DL
HGB BLD-MCNC: 14.3 G/DL
IMM GRANULOCYTES NFR BLD AUTO: 0.6 %
KETONES URINE: NEGATIVE MG/DL
LDLC SERPL CALC-MCNC: 145 MG/DL
LEUKOCYTE ESTERASE URINE: NEGATIVE
LYMPHOCYTES # BLD AUTO: 1.98 K/UL
LYMPHOCYTES NFR BLD AUTO: 25.4 %
MAN DIFF?: NORMAL
MCHC RBC-ENTMCNC: 28.1 PG
MCHC RBC-ENTMCNC: 32.2 GM/DL
MCV RBC AUTO: 87.4 FL
MONOCYTES # BLD AUTO: 1.15 K/UL
MONOCYTES NFR BLD AUTO: 14.7 %
NEUTROPHILS # BLD AUTO: 4.53 K/UL
NEUTROPHILS NFR BLD AUTO: 58.2 %
NITRITE URINE: NEGATIVE
NONHDLC SERPL-MCNC: 164 MG/DL
PH URINE: 7.5
PLATELET # BLD AUTO: 322 K/UL
POTASSIUM SERPL-SCNC: 4.8 MMOL/L
PROT SERPL-MCNC: 7.7 G/DL
PROTEIN URINE: NEGATIVE MG/DL
PSA SERPL-MCNC: 0.25 NG/ML
RBC # BLD: 5.08 M/UL
RBC # FLD: 14.1 %
SODIUM SERPL-SCNC: 138 MMOL/L
SPECIFIC GRAVITY URINE: 1.02
TRIGL SERPL-MCNC: 105 MG/DL
UROBILINOGEN URINE: 1 MG/DL
WBC # FLD AUTO: 7.8 K/UL

## 2023-11-30 RX ORDER — SITAGLIPTIN AND METFORMIN HYDROCHLORIDE 50; 1000 MG/1; MG/1
50-1000 TABLET, FILM COATED ORAL
Qty: 180 | Refills: 2 | Status: ACTIVE | COMMUNITY
Start: 1900-01-01 | End: 1900-01-01

## 2023-12-07 ENCOUNTER — APPOINTMENT (OUTPATIENT)
Dept: CARDIOLOGY | Facility: CLINIC | Age: 68
End: 2023-12-07
Payer: MEDICARE

## 2023-12-07 VITALS
BODY MASS INDEX: 26.67 KG/M2 | HEIGHT: 68 IN | SYSTOLIC BLOOD PRESSURE: 102 MMHG | HEART RATE: 87 BPM | WEIGHT: 176 LBS | OXYGEN SATURATION: 99 % | DIASTOLIC BLOOD PRESSURE: 72 MMHG

## 2023-12-07 DIAGNOSIS — C61 MALIGNANT NEOPLASM OF PROSTATE: ICD-10-CM

## 2023-12-07 PROCEDURE — 99212 OFFICE O/P EST SF 10 MIN: CPT

## 2024-03-07 ENCOUNTER — APPOINTMENT (OUTPATIENT)
Dept: CARDIOLOGY | Facility: CLINIC | Age: 69
End: 2024-03-07
Payer: MEDICARE

## 2024-03-07 VITALS
OXYGEN SATURATION: 99 % | HEIGHT: 68 IN | WEIGHT: 180 LBS | SYSTOLIC BLOOD PRESSURE: 120 MMHG | HEART RATE: 90 BPM | BODY MASS INDEX: 27.28 KG/M2 | DIASTOLIC BLOOD PRESSURE: 72 MMHG

## 2024-03-07 PROCEDURE — 93000 ELECTROCARDIOGRAM COMPLETE: CPT

## 2024-03-07 PROCEDURE — 99213 OFFICE O/P EST LOW 20 MIN: CPT | Mod: 25

## 2024-03-07 NOTE — PHYSICAL EXAM
[No Acute Distress] : no acute distress [Normal Venous Pressure] : normal venous pressure [No Carotid Bruit] : no carotid bruit [Normal S1, S2] : normal S1, S2 [Murmur] : murmur [Clear Lung Fields] : clear lung fields [Soft] : abdomen soft [Non Tender] : non-tender [Moves all extremities] : moves all extremities

## 2024-03-07 NOTE — HISTORY OF PRESENT ILLNESS
[FreeTextEntry1] : Patient with history of resting tachycardia. No recent chest pain, palpitation, SOB. Has continued discomfort in the left lower quadrant.

## 2024-05-20 ENCOUNTER — APPOINTMENT (OUTPATIENT)
Dept: GASTROENTEROLOGY | Facility: CLINIC | Age: 69
End: 2024-05-20
Payer: MEDICARE

## 2024-05-20 VITALS
HEIGHT: 68 IN | DIASTOLIC BLOOD PRESSURE: 70 MMHG | BODY MASS INDEX: 26.07 KG/M2 | HEART RATE: 103 BPM | SYSTOLIC BLOOD PRESSURE: 118 MMHG | WEIGHT: 172 LBS | OXYGEN SATURATION: 93 %

## 2024-05-20 DIAGNOSIS — G89.29 LEFT LOWER QUADRANT PAIN: ICD-10-CM

## 2024-05-20 DIAGNOSIS — R19.04 LEFT LOWER QUADRANT ABDOMINAL SWELLING, MASS AND LUMP: ICD-10-CM

## 2024-05-20 DIAGNOSIS — R10.32 LEFT LOWER QUADRANT PAIN: ICD-10-CM

## 2024-05-20 DIAGNOSIS — C22.9 MALIGNANT NEOPLASM OF LIVER, NOT SPECIFIED AS PRIMARY OR SECONDARY: ICD-10-CM

## 2024-05-20 PROCEDURE — 99203 OFFICE O/P NEW LOW 30 MIN: CPT

## 2024-05-20 NOTE — ASSESSMENT
[FreeTextEntry1] : LLQ pain, LLQ mass on palpitation - Abdominal notable for LLQ mass/swelling on palpation. Examined by Dr. Scott as well. - MRI abdomen did not assess any bowel on review of MRI report. Labs on 5/6/24 reviewed and are all WNL. No further labs needed.  - Check CT A/P and proceed with colonoscopy with Dr. Scott pending results.   Smoking cessation counseling provided. - Provided CDC website info for smoking cessation tips, and further resources via text- Text QUITNOW to 907319

## 2024-05-20 NOTE — HISTORY OF PRESENT ILLNESS
[FreeTextEntry1] : Mr. NARCISO LYONS is a 68-year-old male with a PMH of hepatitis C infection (s/p Harvoni 10 years ago), prostate CA with metastasis to R shoulder and liver (2018), T2DM.  PSH: R shoulder surgery (2023), foot surgery (25 years ago).   Presents for GI evaluation of LLQ pain, referred by Dr. Morejon. Started 1 month ago.  Review of MRI (5/2024) indicated for HCC monitoring on patient's phone - notable for cholelithiasis, 9mm IPMN cystic lesion, no new hepatic lesions (one cyst that is stable). MRI in 1 year for IPMN surveillance.  Normal LFTs, CBC, and CMP (reviewed via patient's phone).   States he feels the pain about 2 times per week. He has regular BM twice daily. Pain is non-radiating, lasts about 30 minutes, feels like 2/10 gnawing pain. Sometimes can improve with a BM, but not always.  He had a colonoscopy 7+ years ago, normal to his knowledge.   Personal history of prostate cancer, initial diagnosis in 2011 (oncologist Dr. Jaxon Painting in [place]).  Denies FH of colon cancer or GI malignancies.  Denies FH of IBD or celiac disease.  Current tobacco smoker- 1.5-2 PPD for 58 years, states he only smokes about 7 cigarettes per day for the last 6 years.  Does not drink alcohol and no recreational drug use.

## 2024-05-20 NOTE — PHYSICAL EXAM
[Sclera] : the sclera and conjunctiva were normal [No Respiratory Distress] : no respiratory distress [Abdomen Soft] : soft [Distended] : distended [Normal] : normal to percussion [LLQ] : in the left lower quadrant [Normal Color / Pigmentation] : normal skin color and pigmentation [Oriented To Time, Place, And Person] : oriented to person, place, and time

## 2024-06-06 ENCOUNTER — APPOINTMENT (OUTPATIENT)
Dept: CARDIOLOGY | Facility: CLINIC | Age: 69
End: 2024-06-06
Payer: MEDICARE

## 2024-06-06 ENCOUNTER — NON-APPOINTMENT (OUTPATIENT)
Age: 69
End: 2024-06-06

## 2024-06-06 VITALS — HEART RATE: 92 BPM | HEIGHT: 68 IN | OXYGEN SATURATION: 98 %

## 2024-06-06 DIAGNOSIS — R00.0 TACHYCARDIA, UNSPECIFIED: ICD-10-CM

## 2024-06-06 DIAGNOSIS — R01.1 CARDIAC MURMUR, UNSPECIFIED: ICD-10-CM

## 2024-06-06 DIAGNOSIS — E11.9 TYPE 2 DIABETES MELLITUS W/OUT COMPLICATIONS: ICD-10-CM

## 2024-06-06 PROCEDURE — 99213 OFFICE O/P EST LOW 20 MIN: CPT

## 2024-06-06 PROCEDURE — 93306 TTE W/DOPPLER COMPLETE: CPT

## 2024-06-06 NOTE — HISTORY OF PRESENT ILLNESS
[FreeTextEntry1] : Patient feeling ok. No chest pain, [place]. Attending gym 3x per week. Evaluated by GI. CT of abdomen requested

## 2024-08-19 ENCOUNTER — RESULT REVIEW (OUTPATIENT)
Age: 69
End: 2024-08-19

## 2024-08-21 NOTE — CONSULT NOTE ADULT - CONSULT REQUESTED DATE/TIME
----- Message from Abel Asencio NP sent at 8/21/2024  9:18 AM CDT -----  Please inform patient of lab results.     1. COVD/Flu negative. Medrol Dose pack sent in.     Thanks for all you do,   Abel    17-Jul-2019 09:18

## 2024-08-28 DIAGNOSIS — K80.20 CALCULUS OF GALLBLADDER W/OUT CHOLECYSTITIS W/OUT OBSTRUCTION: ICD-10-CM

## 2024-09-09 ENCOUNTER — APPOINTMENT (OUTPATIENT)
Dept: SURGERY | Facility: CLINIC | Age: 69
End: 2024-09-09
Payer: MEDICARE

## 2024-09-09 VITALS
WEIGHT: 175.6 LBS | SYSTOLIC BLOOD PRESSURE: 122 MMHG | TEMPERATURE: 98.5 F | HEART RATE: 100 BPM | OXYGEN SATURATION: 98 % | DIASTOLIC BLOOD PRESSURE: 71 MMHG | HEIGHT: 68 IN | BODY MASS INDEX: 26.61 KG/M2

## 2024-09-09 DIAGNOSIS — K80.20 CALCULUS OF GALLBLADDER W/OUT CHOLECYSTITIS W/OUT OBSTRUCTION: ICD-10-CM

## 2024-09-09 PROCEDURE — 99204 OFFICE O/P NEW MOD 45 MIN: CPT

## 2024-09-09 NOTE — PLAN
[FreeTextEntry1] : This is a 69-year-old male with history of metastatic prostate cancer with metastases to his right shoulder and to his liver.  He stated his liver lesion was treated with proton therapy as well as his prostate and radiation.  This was 6 years ago.  He also has chronic hepatitis C but otherwise does well.  He has a  for which he does boxing and very vigorous activities.  He is followed by an oncologist at [facility] and had an MRI this summer which showed that the area in his liver was improved from previous imaging.  He recently noticed pain in his left lower abdomen that was positional when he would sit for a while.  He would stand up and the pain will get better.  It was not associated with food.  He states he does have a history of diverticulitis but this felt very different.  Because of this he was sent for a CAT scan of his abdomen pelvis which showed gallstones.  He was referred to me to be evaluated for possible cholecystectomy.  In addition to the gallstones there was a cystic liver lesion with a calcification within it.  The radiologist at [facility] stated compared to previous imaging a neoplasm was no longer there.  Patient denies fatty food intolerance.  He states he can eat what ever he wants to.  He states he has gained about 5 pounds in the last year despite trying to lose weight.  He denies diarrhea.  He denies blood in his stool.  His past surgical history is negative for abdominal surgery.  He has no known drug allergies  His current medications have been reviewed  Limited review of systems, past medical history, and past surgical history as above.  On limited abdominal exam the patient has a area of point tenderness in the left lower quadrant at the juncture of his rectus muscle and his obliques.  He denies ever having a lump in that area.  Appears to be superficial tenderness originating from the musculature.  There is no tenderness on deep palpation.  There is no tenderness in his right upper quadrant.  He has no obvious hernias.  He has no surgical scars.    Plan: Patient has asymptomatic cholelithiasis diagnosed incidentally on CAT scan looking for source of left lower abdominal pain.  I would not recommend moving the direction of cholecystectomy as the patient has no upper abdominal symptoms.  If he develops them in the future then would recommend proceeding with cholecystectomy.  As far as the pain in his left lower quadrant I feel this is most likely a muscle strain from his vigorous workouts and boxing.  I have recommend he try a 2-week course of Voltaren gel to the area and if it improves over 2 weeks that he can expect it to resolve over 6 weeks time if he reduces his activity.  If there is no improvement after a week then he can stop the Voltaren gel.  This is a location where he might expect a spigelian hernia but he has no history of ever being a lump in that location.  As far as the abnormal finding on the liver this likely correlates with previous ablation of the liver lesion.  He did have an MRI by his oncologist this summer which the radiologist did not have any concerns.  This point of given a copy of his CAT scan at [facility] and he will take it to his [facility] if your oncologist this week so that they can further evaluate if that is expected or more workup is needed.  He understands I do not treat liver lesions or prostate cancer and that those findings will be addressed by his oncologist.  He will follow with me on an as-needed basis.  Overall time to review his chart, imaging, to take a history, physical exam, and discuss all the above multiple issues as well as providing charting took over 45 minutes.

## 2024-09-12 ENCOUNTER — RX RENEWAL (OUTPATIENT)
Age: 69
End: 2024-09-12

## 2024-09-13 ENCOUNTER — APPOINTMENT (OUTPATIENT)
Dept: CARDIOLOGY | Facility: CLINIC | Age: 69
End: 2024-09-13
Payer: MEDICARE

## 2024-09-13 VITALS
RESPIRATION RATE: 18 BRPM | HEART RATE: 108 BPM | BODY MASS INDEX: 25.7 KG/M2 | DIASTOLIC BLOOD PRESSURE: 68 MMHG | SYSTOLIC BLOOD PRESSURE: 126 MMHG | OXYGEN SATURATION: 98 % | TEMPERATURE: 98.4 F | WEIGHT: 169 LBS

## 2024-09-13 DIAGNOSIS — E11.9 TYPE 2 DIABETES MELLITUS W/OUT COMPLICATIONS: ICD-10-CM

## 2024-09-13 DIAGNOSIS — R01.1 CARDIAC MURMUR, UNSPECIFIED: ICD-10-CM

## 2024-09-13 DIAGNOSIS — C22.9 MALIGNANT NEOPLASM OF LIVER, NOT SPECIFIED AS PRIMARY OR SECONDARY: ICD-10-CM

## 2024-09-13 DIAGNOSIS — R00.0 TACHYCARDIA, UNSPECIFIED: ICD-10-CM

## 2024-09-13 PROCEDURE — 99213 OFFICE O/P EST LOW 20 MIN: CPT

## 2024-09-13 NOTE — PHYSICAL EXAM
[No Acute Distress] : no acute distress [Normal Venous Pressure] : normal venous pressure [No Carotid Bruit] : no carotid bruit [Normal S1, S2] : normal S1, S2 [Murmur] : murmur [Clear Lung Fields] : clear lung fields [Soft] : abdomen soft [Non Tender] : non-tender [No Edema] : no edema [Moves all extremities] : moves all extremities

## 2024-09-13 NOTE — HISTORY OF PRESENT ILLNESS
[FreeTextEntry1] : History of sinus tachycardia, murmur and diabetes. No chest pain, SOB, palpitation. Received call from oncologist that liver function test had increased. Advised to see his hepatologist.

## 2024-10-21 ENCOUNTER — APPOINTMENT (OUTPATIENT)
Dept: GASTROENTEROLOGY | Facility: CLINIC | Age: 69
End: 2024-10-21
Payer: MEDICARE

## 2024-10-21 VITALS — OXYGEN SATURATION: 99 % | BODY MASS INDEX: 26.07 KG/M2 | HEIGHT: 68 IN | WEIGHT: 172 LBS | HEART RATE: 95 BPM

## 2024-10-21 DIAGNOSIS — R19.00 INTRA-ABDOMINAL AND PELVIC SWELLING, MASS AND LUMP, UNSPECIFIED SITE: ICD-10-CM

## 2024-10-21 DIAGNOSIS — K57.92 DIVERTICULITIS OF INTESTINE, PART UNSPECIFIED, W/OUT PERFORATION OR ABSCESS W/OUT BLEEDING: ICD-10-CM

## 2024-10-21 PROCEDURE — 99205 OFFICE O/P NEW HI 60 MIN: CPT

## 2024-10-21 RX ORDER — CHLORHEXIDINE GLUCONATE 4 %
LIQUID (ML) TOPICAL
Refills: 0 | Status: ACTIVE | COMMUNITY

## 2024-10-21 RX ORDER — SODIUM PICOSULFATE, MAGNESIUM OXIDE, AND ANHYDROUS CITRIC ACID 12; 3.5; 1 G/175ML; G/175ML; MG/175ML
10-3.5-12 MG-GM LIQUID ORAL
Qty: 2 | Refills: 1 | Status: ACTIVE | COMMUNITY
Start: 2024-10-21 | End: 1900-01-01

## 2024-10-22 ENCOUNTER — RESULT REVIEW (OUTPATIENT)
Age: 69
End: 2024-10-22

## 2024-10-28 ENCOUNTER — RESULT REVIEW (OUTPATIENT)
Age: 69
End: 2024-10-28

## 2024-10-28 ENCOUNTER — APPOINTMENT (OUTPATIENT)
Dept: GASTROENTEROLOGY | Facility: HOSPITAL | Age: 69
End: 2024-10-28

## 2024-10-29 ENCOUNTER — TRANSCRIPTION ENCOUNTER (OUTPATIENT)
Age: 69
End: 2024-10-29

## 2024-11-02 RX ORDER — AMOXICILLIN AND CLAVULANATE POTASSIUM 875; 125 MG/1; MG/1
875-125 TABLET, COATED ORAL
Qty: 20 | Refills: 1 | Status: ACTIVE | COMMUNITY
Start: 2024-11-02 | End: 1900-01-01

## 2025-02-11 ENCOUNTER — LABORATORY RESULT (OUTPATIENT)
Age: 70
End: 2025-02-11

## 2025-02-11 ENCOUNTER — APPOINTMENT (OUTPATIENT)
Dept: GASTROENTEROLOGY | Facility: CLINIC | Age: 70
End: 2025-02-11
Payer: MEDICARE

## 2025-02-11 VITALS
SYSTOLIC BLOOD PRESSURE: 120 MMHG | BODY MASS INDEX: 26.07 KG/M2 | DIASTOLIC BLOOD PRESSURE: 80 MMHG | WEIGHT: 172 LBS | HEIGHT: 68 IN

## 2025-02-11 DIAGNOSIS — C22.9 MALIGNANT NEOPLASM OF LIVER, NOT SPECIFIED AS PRIMARY OR SECONDARY: ICD-10-CM

## 2025-02-11 DIAGNOSIS — K80.20 CALCULUS OF GALLBLADDER W/OUT CHOLECYSTITIS W/OUT OBSTRUCTION: ICD-10-CM

## 2025-02-11 DIAGNOSIS — B19.20 UNSPECIFIED VIRAL HEPATITIS C W/OUT HEPATIC COMA: ICD-10-CM

## 2025-02-11 DIAGNOSIS — K57.92 DIVERTICULITIS OF INTESTINE, PART UNSPECIFIED, W/OUT PERFORATION OR ABSCESS W/OUT BLEEDING: ICD-10-CM

## 2025-02-11 DIAGNOSIS — C61 MALIGNANT NEOPLASM OF PROSTATE: ICD-10-CM

## 2025-02-11 PROCEDURE — 99214 OFFICE O/P EST MOD 30 MIN: CPT

## 2025-02-12 LAB
AFP-TM SERPL-MCNC: 11.7 NG/ML
ALBUMIN SERPL ELPH-MCNC: 4.5 G/DL
ALP BLD-CCNC: 93 U/L
ALT SERPL-CCNC: 51 U/L
ANION GAP SERPL CALC-SCNC: 13 MMOL/L
AST SERPL-CCNC: 40 U/L
BILIRUB SERPL-MCNC: 0.3 MG/DL
BUN SERPL-MCNC: 13 MG/DL
CALCIUM SERPL-MCNC: 9.9 MG/DL
CHLORIDE SERPL-SCNC: 100 MMOL/L
CO2 SERPL-SCNC: 27 MMOL/L
CREAT SERPL-MCNC: 0.9 MG/DL
EGFR: 91 ML/MIN/1.73M2
GLUCOSE SERPL-MCNC: 98 MG/DL
HBV CORE IGG+IGM SER QL: REACTIVE
HBV SURFACE AB SER QL: NONREACTIVE
HBV SURFACE AG SER QL: NONREACTIVE
HCV AB SER QL: REACTIVE
HCV RNA SERPL NAA+PROBE-LOG IU: NOT DETECTED LOGIU/ML
HCV S/CO RATIO: 13.1 S/CO
HEPC RNA INTERP: NOT DETECTED
POTASSIUM SERPL-SCNC: 5.2 MMOL/L
PROT SERPL-MCNC: 7.5 G/DL
SODIUM SERPL-SCNC: 140 MMOL/L

## 2025-04-29 ENCOUNTER — NON-APPOINTMENT (OUTPATIENT)
Age: 70
End: 2025-04-29

## 2025-05-01 ENCOUNTER — APPOINTMENT (OUTPATIENT)
Dept: CARDIOLOGY | Facility: CLINIC | Age: 70
End: 2025-05-01
Payer: MEDICARE

## 2025-05-01 VITALS
SYSTOLIC BLOOD PRESSURE: 110 MMHG | HEART RATE: 91 BPM | DIASTOLIC BLOOD PRESSURE: 60 MMHG | OXYGEN SATURATION: 98 % | BODY MASS INDEX: 26.83 KG/M2 | RESPIRATION RATE: 17 BRPM | HEIGHT: 68 IN | WEIGHT: 177 LBS

## 2025-05-01 DIAGNOSIS — R01.1 CARDIAC MURMUR, UNSPECIFIED: ICD-10-CM

## 2025-05-01 DIAGNOSIS — E11.9 TYPE 2 DIABETES MELLITUS W/OUT COMPLICATIONS: ICD-10-CM

## 2025-05-01 DIAGNOSIS — C61 MALIGNANT NEOPLASM OF PROSTATE: ICD-10-CM

## 2025-05-01 DIAGNOSIS — C22.9 MALIGNANT NEOPLASM OF LIVER, NOT SPECIFIED AS PRIMARY OR SECONDARY: ICD-10-CM

## 2025-05-01 DIAGNOSIS — R00.0 TACHYCARDIA, UNSPECIFIED: ICD-10-CM

## 2025-05-01 PROCEDURE — 93000 ELECTROCARDIOGRAM COMPLETE: CPT

## 2025-05-01 PROCEDURE — 99213 OFFICE O/P EST LOW 20 MIN: CPT | Mod: 25

## 2025-06-30 ENCOUNTER — RX RENEWAL (OUTPATIENT)
Age: 70
End: 2025-06-30

## 2025-08-07 ENCOUNTER — APPOINTMENT (OUTPATIENT)
Dept: CARDIOLOGY | Facility: CLINIC | Age: 70
End: 2025-08-07
Payer: MEDICARE

## 2025-08-07 VITALS
BODY MASS INDEX: 25.61 KG/M2 | OXYGEN SATURATION: 97 % | WEIGHT: 169 LBS | DIASTOLIC BLOOD PRESSURE: 80 MMHG | SYSTOLIC BLOOD PRESSURE: 127 MMHG | HEART RATE: 96 BPM | HEIGHT: 68 IN

## 2025-08-07 DIAGNOSIS — R01.1 CARDIAC MURMUR, UNSPECIFIED: ICD-10-CM

## 2025-08-07 DIAGNOSIS — C61 MALIGNANT NEOPLASM OF PROSTATE: ICD-10-CM

## 2025-08-07 DIAGNOSIS — R00.0 TACHYCARDIA, UNSPECIFIED: ICD-10-CM

## 2025-08-07 DIAGNOSIS — E11.9 TYPE 2 DIABETES MELLITUS W/OUT COMPLICATIONS: ICD-10-CM

## 2025-08-07 PROCEDURE — 99212 OFFICE O/P EST SF 10 MIN: CPT
